# Patient Record
Sex: FEMALE | Race: WHITE | NOT HISPANIC OR LATINO | URBAN - METROPOLITAN AREA
[De-identification: names, ages, dates, MRNs, and addresses within clinical notes are randomized per-mention and may not be internally consistent; named-entity substitution may affect disease eponyms.]

---

## 2021-01-08 ENCOUNTER — INPATIENT (INPATIENT)
Facility: HOSPITAL | Age: 86
LOS: 6 days | Discharge: HOSPICE | End: 2021-01-15
Attending: INTERNAL MEDICINE | Admitting: INTERNAL MEDICINE
Payer: MEDICARE

## 2021-01-08 VITALS
OXYGEN SATURATION: 95 % | DIASTOLIC BLOOD PRESSURE: 76 MMHG | TEMPERATURE: 97 F | SYSTOLIC BLOOD PRESSURE: 100 MMHG | HEART RATE: 104 BPM | RESPIRATION RATE: 16 BRPM | HEIGHT: 58 IN | WEIGHT: 89.95 LBS

## 2021-01-08 LAB
ALBUMIN SERPL ELPH-MCNC: 2.7 G/DL — LOW (ref 3.3–5)
ALP SERPL-CCNC: 97 U/L — SIGNIFICANT CHANGE UP (ref 40–120)
ALT FLD-CCNC: 15 U/L — SIGNIFICANT CHANGE UP (ref 12–78)
ANION GAP SERPL CALC-SCNC: 4 MMOL/L — LOW (ref 5–17)
APPEARANCE UR: ABNORMAL
APTT BLD: 23.6 SEC — LOW (ref 27.5–35.5)
AST SERPL-CCNC: 16 U/L — SIGNIFICANT CHANGE UP (ref 15–37)
BACTERIA # UR AUTO: ABNORMAL
BILIRUB SERPL-MCNC: 0.4 MG/DL — SIGNIFICANT CHANGE UP (ref 0.2–1.2)
BILIRUB UR-MCNC: NEGATIVE — SIGNIFICANT CHANGE UP
BLD GP AB SCN SERPL QL: SIGNIFICANT CHANGE UP
BUN SERPL-MCNC: 65 MG/DL — HIGH (ref 7–23)
CALCIUM SERPL-MCNC: 8.1 MG/DL — LOW (ref 8.5–10.1)
CHLORIDE SERPL-SCNC: 129 MMOL/L — HIGH (ref 96–108)
CO2 SERPL-SCNC: 24 MMOL/L — SIGNIFICANT CHANGE UP (ref 22–31)
COLOR SPEC: YELLOW — SIGNIFICANT CHANGE UP
CREAT SERPL-MCNC: 1.92 MG/DL — HIGH (ref 0.5–1.3)
DIFF PNL FLD: NEGATIVE — SIGNIFICANT CHANGE UP
EPI CELLS # UR: SIGNIFICANT CHANGE UP
GLUCOSE SERPL-MCNC: 126 MG/DL — HIGH (ref 70–99)
GLUCOSE UR QL: NEGATIVE MG/DL — SIGNIFICANT CHANGE UP
HCT VFR BLD CALC: 20 % — CRITICAL LOW (ref 34.5–45)
HGB BLD-MCNC: 4.5 G/DL — CRITICAL LOW (ref 11.5–15.5)
HYALINE CASTS # UR AUTO: ABNORMAL /LPF
INR BLD: 1.3 RATIO — HIGH (ref 0.88–1.16)
KETONES UR-MCNC: NEGATIVE — SIGNIFICANT CHANGE UP
LEUKOCYTE ESTERASE UR-ACNC: NEGATIVE — SIGNIFICANT CHANGE UP
MCHC RBC-ENTMCNC: 15.5 PG — LOW (ref 27–34)
MCHC RBC-ENTMCNC: 22.5 GM/DL — LOW (ref 32–36)
MCV RBC AUTO: 69 FL — LOW (ref 80–100)
NITRITE UR-MCNC: NEGATIVE — SIGNIFICANT CHANGE UP
NRBC # BLD: 0 /100 WBCS — SIGNIFICANT CHANGE UP (ref 0–0)
PH UR: 5 — SIGNIFICANT CHANGE UP (ref 5–8)
PLATELET # BLD AUTO: 152 K/UL — SIGNIFICANT CHANGE UP (ref 150–400)
POTASSIUM SERPL-MCNC: 3.9 MMOL/L — SIGNIFICANT CHANGE UP (ref 3.5–5.3)
POTASSIUM SERPL-SCNC: 3.9 MMOL/L — SIGNIFICANT CHANGE UP (ref 3.5–5.3)
PROT SERPL-MCNC: 7.2 GM/DL — SIGNIFICANT CHANGE UP (ref 6–8.3)
PROT UR-MCNC: 15 MG/DL
PROTHROM AB SERPL-ACNC: 14.9 SEC — HIGH (ref 10.6–13.6)
RBC # BLD: 2.9 M/UL — LOW (ref 3.8–5.2)
RBC # FLD: 24.2 % — HIGH (ref 10.3–14.5)
SODIUM SERPL-SCNC: 157 MMOL/L — HIGH (ref 135–145)
SP GR SPEC: 1.01 — SIGNIFICANT CHANGE UP (ref 1.01–1.02)
UROBILINOGEN FLD QL: NEGATIVE MG/DL — SIGNIFICANT CHANGE UP
WBC # BLD: 12.37 K/UL — HIGH (ref 3.8–10.5)
WBC # FLD AUTO: 12.37 K/UL — HIGH (ref 3.8–10.5)
WBC UR QL: SIGNIFICANT CHANGE UP

## 2021-01-08 PROCEDURE — 70486 CT MAXILLOFACIAL W/O DYE: CPT | Mod: 26,MA

## 2021-01-08 PROCEDURE — 70450 CT HEAD/BRAIN W/O DYE: CPT | Mod: 26,MA

## 2021-01-08 PROCEDURE — 93010 ELECTROCARDIOGRAM REPORT: CPT

## 2021-01-08 PROCEDURE — 99285 EMERGENCY DEPT VISIT HI MDM: CPT

## 2021-01-08 PROCEDURE — 99223 1ST HOSP IP/OBS HIGH 75: CPT | Mod: AI

## 2021-01-08 RX ORDER — SODIUM CHLORIDE 9 MG/ML
1000 INJECTION INTRAMUSCULAR; INTRAVENOUS; SUBCUTANEOUS ONCE
Refills: 0 | Status: COMPLETED | OUTPATIENT
Start: 2021-01-08 | End: 2021-01-08

## 2021-01-08 RX ORDER — PANTOPRAZOLE SODIUM 20 MG/1
40 TABLET, DELAYED RELEASE ORAL DAILY
Refills: 0 | Status: DISCONTINUED | OUTPATIENT
Start: 2021-01-08 | End: 2021-01-09

## 2021-01-08 RX ORDER — HEPARIN SODIUM 5000 [USP'U]/ML
5000 INJECTION INTRAVENOUS; SUBCUTANEOUS EVERY 12 HOURS
Refills: 0 | Status: DISCONTINUED | OUTPATIENT
Start: 2021-01-08 | End: 2021-01-09

## 2021-01-08 RX ADMIN — SODIUM CHLORIDE 1000 MILLILITER(S): 9 INJECTION INTRAMUSCULAR; INTRAVENOUS; SUBCUTANEOUS at 12:34

## 2021-01-08 NOTE — H&P ADULT - HISTORY OF PRESENT ILLNESS
89 year old male admitted for facial pain. On arrival found to be anemic and have hypernatremia.   89 year old female no significant PMH admitted for facial pain. On arrival found to be anemic and have hypernatremia.  Lives at home with HHA. Spoke to nephew Ronny at [phone]. He is looking for eventual nursing home placement.  CT sinuses normal on arrival. Patient is non-verbal at baseline.   Patient is more bedbound past several weeks, no much ambulation.    89 year old female no significant PMH admitted for facial pain. On arrival found to be anemic and have hypernatremia.  Lives at home with HHA. Spoke to nephew Ronny at [phone]. He is looking for eventual nursing home placement.  CT sinuses normal on arrival. Patient is non-verbal at baseline.   Patient is more bedbound past several weeks, no much ambulation. Guiac negative in ER.

## 2021-01-08 NOTE — ED ADULT NURSE NOTE - NSFALLRSKASSESASSIST_ED_ALL_ED
Assumed care of pt  Offers no c/o while lying in bed  sats good slight  Edema noted to ankles  Watching tv    Will review    yes

## 2021-01-08 NOTE — ED ADULT TRIAGE NOTE - CHIEF COMPLAINT QUOTE
Aide said difficulty chewing with right side facial area swollen, denies trauma, not answering questions which is her baseline according to nephew as per EMS, pt fidgety on ems stretcher moved closer to desk fall risk

## 2021-01-08 NOTE — ED PROVIDER NOTE - CLINICAL SUMMARY MEDICAL DECISION MAKING FREE TEXT BOX
no ttp on facial exam. no swelling or erythema. screening labs asignificant for hypernatremia and severe anemia. rectal exam with no melena or blood. rehydration and transfusion adminsitered. patietn at baseline mental status.

## 2021-01-08 NOTE — ED ADULT NURSE NOTE - OBJECTIVE STATEMENT
received pt to bed PHall awake, alert, non verbal. as per triage note "Aide said difficulty chewing with right side facial area swollen, denies trauma," pt denies pain with palpation. awaiting evaluation by md. received pt to bed PHall awake, alert, non verbal. as per triage note "Aide said difficulty chewing with right side facial area swollen, denies trauma," pt denies pain with palpation. awaiting evaluation by md.. blanchable redness to sacrum and trochanter

## 2021-01-08 NOTE — H&P ADULT - NSHPPHYSICALEXAM_GEN_ALL_CORE
PHYSICAL EXAMINATION:  Vital Signs Last 24 Hrs  T(C): 36.3 (08 Jan 2021 11:22), Max: 36.3 (08 Jan 2021 11:22)  T(F): 97.4 (08 Jan 2021 11:22), Max: 97.4 (08 Jan 2021 11:22)  HR: 104 (08 Jan 2021 11:22) (104 - 104)  BP: 100/76 (08 Jan 2021 11:22) (100/76 - 100/76)  BP(mean): --  RR: 16 (08 Jan 2021 11:22) (16 - 16)  SpO2: 95% (08 Jan 2021 11:22) (95% - 95%)  CAPILLARY BLOOD GLUCOSE          GENERAL: NAD, well-groomed, well-developed  HEAD:  atraumatic, normocephalic  EYES: sclera anicteric  ENMT: mucous membranes moist  NECK: supple, No JVD  CHEST/LUNG: clear to auscultation bilaterally; no rales, rhonchi, or wheezing b/l  HEART: normal S1, S2  ABDOMEN: BS+, soft, ND, NT   EXTREMITIES:  pulses palpable; no clubbing, cyanosis, or edema b/l LEs  NEURO: awake, alert, interactive; moves all extremities  SKIN: no rashes or lesions PHYSICAL EXAMINATION:  Vital Signs Last 24 Hrs  T(C): 36.3 (08 Jan 2021 11:22), Max: 36.3 (08 Jan 2021 11:22)  T(F): 97.4 (08 Jan 2021 11:22), Max: 97.4 (08 Jan 2021 11:22)  HR: 104 (08 Jan 2021 11:22) (104 - 104)  BP: 100/76 (08 Jan 2021 11:22) (100/76 - 100/76)  BP(mean): --  RR: 16 (08 Jan 2021 11:22) (16 - 16)  SpO2: 95% (08 Jan 2021 11:22) (95% - 95%)  CAPILLARY BLOOD GLUCOSE          GENERAL: NAD, well-groomed, well-developed, seen in ER, non-verbal at baseline,  HEAD:  atraumatic, normocephalic  EYES: sclera anicteric  ENMT: mucous membranes moist  NECK: supple, No JVD  CHEST/LUNG: clear to auscultation bilaterally; no rales, rhonchi, or wheezing b/l  HEART: normal S1, S2  ABDOMEN: BS+, soft, ND, NT   EXTREMITIES:  pulses palpable; no clubbing, cyanosis, or edema b/l LEs  NEURO: awake, alert, interactive; moves all extremities  SKIN: no rashes or lesions

## 2021-01-08 NOTE — H&P ADULT - ASSESSMENT
89 year old male admitted for facial pain. On arrival found to be anemic and have hypernatremia.  89 year old female no significant PMH admitted for facial pain. On arrival found to be anemic and have hypernatremia.  Lives at home with HHA. Spoke to nephmiguel Jefferson at [phone]. He is looking for eventual nursing home placement.  CT sinuses normal on arrival. Patient is non-verbal at baseline.       GI: Guiac negative in ER. HGB low at 4.5. Start IV Protonix. Two units of PRBC given, follow CBC in AM. Likely anemia of chronic disease  as guiac negtive. Family does not want GI eval.     Renal: LINN from dehydration and hypernatremia. IVF D5W, follow SMA-7 in AM.      Nephew will bring in home med list in AM.   Spoke to olivia Jefferson at [phone]. Did not have med list with him.

## 2021-01-08 NOTE — H&P ADULT - NSHPLABSRESULTS_GEN_ALL_CORE
LABS:                        4.5    12.37 )-----------( 152      ( 2021 12:52 )             20.0     01-08    157<H>  |  129<H>  |  65<H>  ----------------------------<  126<H>  3.9   |  24  |  1.92<H>    Ca    8.1<L>      2021 12:52    TPro  7.2  /  Alb  2.7<L>  /  TBili  0.4  /  DBili  x   /  AST  16  /  ALT  15  /  AlkPhos  97  01-08    PT/INR - ( 2021 12:52 )   PT: 14.9 sec;   INR: 1.30 ratio         PTT - ( 2021 12:52 )  PTT:23.6 sec  Urinalysis Basic - ( 2021 12:55 )    Color: Yellow / Appearance: Slightly Turbid / S.015 / pH: x  Gluc: x / Ketone: Negative  / Bili: Negative / Urobili: Negative mg/dL   Blood: x / Protein: 15 mg/dL / Nitrite: Negative   Leuk Esterase: Negative / RBC: x / WBC 0-2   Sq Epi: x / Non Sq Epi: Few / Bacteria: Moderate          RADIOLOGY & ADDITIONAL TESTS:

## 2021-01-08 NOTE — ED PROVIDER NOTE - OBJECTIVE STATEMENT
89F presents with decreased PO intake for 1 week. family assumes it is due to facial pain. no recent trauma. no fevers and no recnt illness.

## 2021-01-09 LAB
ANION GAP SERPL CALC-SCNC: 4 MMOL/L — LOW (ref 5–17)
ANION GAP SERPL CALC-SCNC: 5 MMOL/L — SIGNIFICANT CHANGE UP (ref 5–17)
ANION GAP SERPL CALC-SCNC: 7 MMOL/L — SIGNIFICANT CHANGE UP (ref 5–17)
BUN SERPL-MCNC: 40 MG/DL — HIGH (ref 7–23)
BUN SERPL-MCNC: 41 MG/DL — HIGH (ref 7–23)
BUN SERPL-MCNC: 51 MG/DL — HIGH (ref 7–23)
CALCIUM SERPL-MCNC: 7.5 MG/DL — LOW (ref 8.5–10.1)
CALCIUM SERPL-MCNC: 7.7 MG/DL — LOW (ref 8.5–10.1)
CALCIUM SERPL-MCNC: 7.7 MG/DL — LOW (ref 8.5–10.1)
CHLORIDE SERPL-SCNC: 136 MMOL/L — HIGH (ref 96–108)
CHLORIDE SERPL-SCNC: 136 MMOL/L — HIGH (ref 96–108)
CHLORIDE SERPL-SCNC: 141 MMOL/L — HIGH (ref 96–108)
CO2 SERPL-SCNC: 21 MMOL/L — LOW (ref 22–31)
CO2 SERPL-SCNC: 22 MMOL/L — SIGNIFICANT CHANGE UP (ref 22–31)
CO2 SERPL-SCNC: 23 MMOL/L — SIGNIFICANT CHANGE UP (ref 22–31)
CREAT SERPL-MCNC: 1.19 MG/DL — SIGNIFICANT CHANGE UP (ref 0.5–1.3)
CREAT SERPL-MCNC: 1.19 MG/DL — SIGNIFICANT CHANGE UP (ref 0.5–1.3)
CREAT SERPL-MCNC: 1.32 MG/DL — HIGH (ref 0.5–1.3)
GLUCOSE SERPL-MCNC: 104 MG/DL — HIGH (ref 70–99)
GLUCOSE SERPL-MCNC: 106 MG/DL — HIGH (ref 70–99)
GLUCOSE SERPL-MCNC: 110 MG/DL — HIGH (ref 70–99)
HCT VFR BLD CALC: 30.8 % — LOW (ref 34.5–45)
HGB BLD-MCNC: 9 G/DL — LOW (ref 11.5–15.5)
MCHC RBC-ENTMCNC: 21.7 PG — LOW (ref 27–34)
MCHC RBC-ENTMCNC: 29.2 GM/DL — LOW (ref 32–36)
MCV RBC AUTO: 74.4 FL — LOW (ref 80–100)
NRBC # BLD: 0 /100 WBCS — SIGNIFICANT CHANGE UP (ref 0–0)
PLATELET # BLD AUTO: 125 K/UL — LOW (ref 150–400)
POTASSIUM SERPL-MCNC: 3.1 MMOL/L — LOW (ref 3.5–5.3)
POTASSIUM SERPL-MCNC: 3.4 MMOL/L — LOW (ref 3.5–5.3)
POTASSIUM SERPL-MCNC: 3.6 MMOL/L — SIGNIFICANT CHANGE UP (ref 3.5–5.3)
POTASSIUM SERPL-SCNC: 3.1 MMOL/L — LOW (ref 3.5–5.3)
POTASSIUM SERPL-SCNC: 3.4 MMOL/L — LOW (ref 3.5–5.3)
POTASSIUM SERPL-SCNC: 3.6 MMOL/L — SIGNIFICANT CHANGE UP (ref 3.5–5.3)
RBC # BLD: 4.14 M/UL — SIGNIFICANT CHANGE UP (ref 3.8–5.2)
RBC # FLD: 22.5 % — HIGH (ref 10.3–14.5)
SODIUM SERPL-SCNC: 163 MMOL/L — CRITICAL HIGH (ref 135–145)
SODIUM SERPL-SCNC: 165 MMOL/L — CRITICAL HIGH (ref 135–145)
SODIUM SERPL-SCNC: 167 MMOL/L — CRITICAL HIGH (ref 135–145)
WBC # BLD: 10.78 K/UL — HIGH (ref 3.8–10.5)
WBC # FLD AUTO: 10.78 K/UL — HIGH (ref 3.8–10.5)

## 2021-01-09 PROCEDURE — 99233 SBSQ HOSP IP/OBS HIGH 50: CPT

## 2021-01-09 PROCEDURE — 99497 ADVNCD CARE PLAN 30 MIN: CPT

## 2021-01-09 RX ORDER — FERROUS SULFATE 325(65) MG
300 TABLET ORAL DAILY
Refills: 0 | Status: DISCONTINUED | OUTPATIENT
Start: 2021-01-09 | End: 2021-01-14

## 2021-01-09 RX ORDER — SODIUM CHLORIDE 9 MG/ML
1000 INJECTION, SOLUTION INTRAVENOUS
Refills: 0 | Status: DISCONTINUED | OUTPATIENT
Start: 2021-01-09 | End: 2021-01-09

## 2021-01-09 RX ORDER — FLUCONAZOLE 150 MG/1
100 TABLET ORAL ONCE
Refills: 0 | Status: COMPLETED | OUTPATIENT
Start: 2021-01-09 | End: 2021-01-09

## 2021-01-09 RX ORDER — POTASSIUM CHLORIDE 20 MEQ
40 PACKET (EA) ORAL EVERY 4 HOURS
Refills: 0 | Status: DISCONTINUED | OUTPATIENT
Start: 2021-01-09 | End: 2021-01-09

## 2021-01-09 RX ORDER — SODIUM CHLORIDE 9 MG/ML
1000 INJECTION, SOLUTION INTRAVENOUS
Refills: 0 | Status: DISCONTINUED | OUTPATIENT
Start: 2021-01-09 | End: 2021-01-11

## 2021-01-09 RX ORDER — INFLUENZA VIRUS VACCINE 15; 15; 15; 15 UG/.5ML; UG/.5ML; UG/.5ML; UG/.5ML
0.5 SUSPENSION INTRAMUSCULAR ONCE
Refills: 0 | Status: DISCONTINUED | OUTPATIENT
Start: 2021-01-09 | End: 2021-01-15

## 2021-01-09 RX ORDER — PANTOPRAZOLE SODIUM 20 MG/1
40 TABLET, DELAYED RELEASE ORAL DAILY
Refills: 0 | Status: DISCONTINUED | OUTPATIENT
Start: 2021-01-09 | End: 2021-01-15

## 2021-01-09 RX ORDER — POTASSIUM CHLORIDE 20 MEQ
40 PACKET (EA) ORAL EVERY 4 HOURS
Refills: 0 | Status: COMPLETED | OUTPATIENT
Start: 2021-01-09 | End: 2021-01-10

## 2021-01-09 RX ORDER — FLUCONAZOLE 150 MG/1
TABLET ORAL
Refills: 0 | Status: DISCONTINUED | OUTPATIENT
Start: 2021-01-09 | End: 2021-01-10

## 2021-01-09 RX ORDER — FLUCONAZOLE 150 MG/1
100 TABLET ORAL EVERY 24 HOURS
Refills: 0 | Status: DISCONTINUED | OUTPATIENT
Start: 2021-01-10 | End: 2021-01-10

## 2021-01-09 RX ADMIN — SODIUM CHLORIDE 75 MILLILITER(S): 9 INJECTION, SOLUTION INTRAVENOUS at 08:49

## 2021-01-09 RX ADMIN — SODIUM CHLORIDE 500 MILLILITER(S): 9 INJECTION, SOLUTION INTRAVENOUS at 20:30

## 2021-01-09 RX ADMIN — PANTOPRAZOLE SODIUM 40 MILLIGRAM(S): 20 TABLET, DELAYED RELEASE ORAL at 11:51

## 2021-01-09 RX ADMIN — FLUCONAZOLE 50 MILLIGRAM(S): 150 TABLET ORAL at 18:27

## 2021-01-09 NOTE — PROGRESS NOTE ADULT - SUBJECTIVE AND OBJECTIVE BOX
CHIEF COMPLAINT: Follow up of dehydration and facial pain  no fever  no sob reported  + malodorous discharge from mouth  no active gross bleeding       PHYSICAL EXAM:    GENERAL: elderly and non verbal  HEENT. malodorous discharge from mouth. dried blood.   CHEST/LUNG: Decreased air entry bibasally, no wheezing, no crackles   HEART: Regular rate and rhythm; No murmurs, rubs  ABDOMEN: Soft, Nontender, Nondistended; Bowel sounds present  EXTREMITIES:  no cyanosis or edema legs.   NERVOUS SYSTEM:  limited but grossly non focal.   Psychiatry: Alert and non verbal      OBJECTIVE DATA:   Vital Signs Last 24 Hrs  T(C): 36.4 (2021 11:05), Max: 37.5 (2021 20:01)  T(F): 97.6 (2021 11:05), Max: 99.5 (2021 20:01)  HR: 83 (2021 11:51) (77 - 109)  BP: 110/56 (2021 11:51) (96/37 - 110/59)  BP(mean): 76 (2021 03:26) (76 - 76)  RR: 15 (2021 11:51) (15 - 18)  SpO2: 94% (2021 11:51) (94% - 98%)           Daily     Daily   LABS:                        9.0    10.78 )-----------( 125      ( 2021 07:22 )             30.8             -09    165<HH>  |  136<H>  |  51<H>  ----------------------------<  106<H>  3.6   |  22  |  1.32<H>    Ca    7.5<L>      2021 07:22    TPro  7.2  /  Alb  2.7<L>  /  TBili  0.4  /  DBili  x   /  AST  16  /  ALT  15  /  AlkPhos  97  -08              PT/INR - ( 2021 12:52 )   PT: 14.9 sec;   INR: 1.30 ratio         PTT - ( 2021 12:52 )  PTT:23.6 sec  Urinalysis Basic - ( 2021 12:55 )    Color: Yellow / Appearance: Slightly Turbid / S.015 / pH: x  Gluc: x / Ketone: Negative  / Bili: Negative / Urobili: Negative mg/dL   Blood: x / Protein: 15 mg/dL / Nitrite: Negative   Leuk Esterase: Negative / RBC: x / WBC 0-2   Sq Epi: x / Non Sq Epi: Few / Bacteria: Moderate         Interval Radiology studies: reviewed by me    < from: CT Head No Cont (21 @ 15:56) >  IMPRESSION:  Right parietal mass most characteristic of a meningioma without acute findings.    < end of copied text >      MEDICATIONS  (STANDING):  dextrose 5%. 1000 milliLiter(s) (125 mL/Hr) IV Continuous <Continuous>  ferrous    sulfate Liquid 300 milliGRAM(s) Oral daily  fluconAZOLE IVPB      influenza   Vaccine 0.5 milliLiter(s) IntraMuscular once  pantoprazole  Injectable 40 milliGRAM(s) IV Push daily

## 2021-01-09 NOTE — PROGRESS NOTE ADULT - ASSESSMENT
Severe anemia superimposed on likely anemia of chronic disease and microcytic anemia. no active gross bleeding. FOBT negative. s/p blood transfusion. Trend H and H. check iron profile. start feosol.   ARF. unknown baseline creatinine. cont IVF> follow I and Os. follow BMP now and in am.   oral candidiasis and malodorous discharge mouth in setting of dehydration. cont IVF. Give iv diflucan. oral hygiene  Facial pain. parietal mass on CT head is likely incidental finding. Per Nephew Veda (POA)>> no aggressive intervention.   Hypovolemic hypernatremia. start dextrose IVF and free water. follow BMP now and in am.     DVT ppx: SCDs   DNR/DNI

## 2021-01-09 NOTE — ED ADULT NURSE REASSESSMENT NOTE - NS ED NURSE REASSESS COMMENT FT1
No acute transfusion reaction noted post PRBCS, v/s stable, no acute distress noted, will continue to monitor.

## 2021-01-09 NOTE — PROVIDER CONTACT NOTE (CRITICAL VALUE NOTIFICATION) - ACTION/TREATMENT ORDERED:
none at this time, patient just completed 1/2 NS bolus
d5 increased to 150mL/hr, Calderon, bolus.
ORDERS TO FOLLOW

## 2021-01-10 LAB
ANION GAP SERPL CALC-SCNC: 3 MMOL/L — LOW (ref 5–17)
ANION GAP SERPL CALC-SCNC: 5 MMOL/L — SIGNIFICANT CHANGE UP (ref 5–17)
BUN SERPL-MCNC: 31 MG/DL — HIGH (ref 7–23)
BUN SERPL-MCNC: 33 MG/DL — HIGH (ref 7–23)
CALCIUM SERPL-MCNC: 7.3 MG/DL — LOW (ref 8.5–10.1)
CALCIUM SERPL-MCNC: 7.3 MG/DL — LOW (ref 8.5–10.1)
CHLORIDE SERPL-SCNC: 132 MMOL/L — HIGH (ref 96–108)
CHLORIDE SERPL-SCNC: 133 MMOL/L — HIGH (ref 96–108)
CO2 SERPL-SCNC: 17 MMOL/L — LOW (ref 22–31)
CO2 SERPL-SCNC: 20 MMOL/L — LOW (ref 22–31)
CREAT SERPL-MCNC: 1.05 MG/DL — SIGNIFICANT CHANGE UP (ref 0.5–1.3)
CREAT SERPL-MCNC: 1.07 MG/DL — SIGNIFICANT CHANGE UP (ref 0.5–1.3)
FOLATE SERPL-MCNC: 8.1 NG/ML — SIGNIFICANT CHANGE UP
GLUCOSE SERPL-MCNC: 147 MG/DL — HIGH (ref 70–99)
GLUCOSE SERPL-MCNC: 148 MG/DL — HIGH (ref 70–99)
HCT VFR BLD CALC: 28.3 % — LOW (ref 34.5–45)
HGB BLD-MCNC: 8.1 G/DL — LOW (ref 11.5–15.5)
IRON SATN MFR SERPL: 13 % — LOW (ref 14–50)
IRON SATN MFR SERPL: 28 UG/DL — LOW (ref 30–160)
MCHC RBC-ENTMCNC: 21.3 PG — LOW (ref 27–34)
MCHC RBC-ENTMCNC: 28.6 GM/DL — LOW (ref 32–36)
MCV RBC AUTO: 74.5 FL — LOW (ref 80–100)
NRBC # BLD: 1 /100 WBCS — HIGH (ref 0–0)
OB PNL STL: NEGATIVE — SIGNIFICANT CHANGE UP
PLATELET # BLD AUTO: 94 K/UL — LOW (ref 150–400)
POTASSIUM SERPL-MCNC: 4.1 MMOL/L — SIGNIFICANT CHANGE UP (ref 3.5–5.3)
POTASSIUM SERPL-MCNC: 4.3 MMOL/L — SIGNIFICANT CHANGE UP (ref 3.5–5.3)
POTASSIUM SERPL-SCNC: 4.1 MMOL/L — SIGNIFICANT CHANGE UP (ref 3.5–5.3)
POTASSIUM SERPL-SCNC: 4.3 MMOL/L — SIGNIFICANT CHANGE UP (ref 3.5–5.3)
RAPID RVP RESULT: SIGNIFICANT CHANGE UP
RBC # BLD: 3.8 M/UL — SIGNIFICANT CHANGE UP (ref 3.8–5.2)
RBC # BLD: 3.8 M/UL — SIGNIFICANT CHANGE UP (ref 3.8–5.2)
RBC # FLD: 22.6 % — HIGH (ref 10.3–14.5)
RETICS #: 22.7 K/UL — LOW (ref 25–125)
RETICS/RBC NFR: 0.6 % — SIGNIFICANT CHANGE UP (ref 0.5–2.5)
SARS-COV-2 RNA SPEC QL NAA+PROBE: SIGNIFICANT CHANGE UP
SODIUM SERPL-SCNC: 154 MMOL/L — HIGH (ref 135–145)
SODIUM SERPL-SCNC: 156 MMOL/L — HIGH (ref 135–145)
TIBC SERPL-MCNC: 223 UG/DL — SIGNIFICANT CHANGE UP (ref 220–430)
UIBC SERPL-MCNC: 194 UG/DL — SIGNIFICANT CHANGE UP (ref 110–370)
VIT B12 SERPL-MCNC: 582 PG/ML — SIGNIFICANT CHANGE UP (ref 232–1245)
WBC # BLD: 11.03 K/UL — HIGH (ref 3.8–10.5)
WBC # FLD AUTO: 11.03 K/UL — HIGH (ref 3.8–10.5)

## 2021-01-10 PROCEDURE — 99497 ADVNCD CARE PLAN 30 MIN: CPT

## 2021-01-10 PROCEDURE — 99233 SBSQ HOSP IP/OBS HIGH 50: CPT

## 2021-01-10 RX ORDER — FLUCONAZOLE 150 MG/1
100 TABLET ORAL DAILY
Refills: 0 | Status: COMPLETED | OUTPATIENT
Start: 2021-01-10 | End: 2021-01-13

## 2021-01-10 RX ORDER — FLUCONAZOLE 150 MG/1
100 TABLET ORAL DAILY
Refills: 0 | Status: DISCONTINUED | OUTPATIENT
Start: 2021-01-10 | End: 2021-01-10

## 2021-01-10 RX ADMIN — SODIUM CHLORIDE 150 MILLILITER(S): 9 INJECTION, SOLUTION INTRAVENOUS at 03:31

## 2021-01-10 RX ADMIN — Medication 40 MILLIEQUIVALENT(S): at 02:28

## 2021-01-10 RX ADMIN — PANTOPRAZOLE SODIUM 40 MILLIGRAM(S): 20 TABLET, DELAYED RELEASE ORAL at 12:57

## 2021-01-10 RX ADMIN — SODIUM CHLORIDE 150 MILLILITER(S): 9 INJECTION, SOLUTION INTRAVENOUS at 12:57

## 2021-01-10 RX ADMIN — Medication 300 MILLIGRAM(S): at 12:57

## 2021-01-10 RX ADMIN — Medication 40 MILLIEQUIVALENT(S): at 00:13

## 2021-01-10 RX ADMIN — FLUCONAZOLE 100 MILLIGRAM(S): 150 TABLET ORAL at 16:46

## 2021-01-10 NOTE — PROGRESS NOTE ADULT - ASSESSMENT
Severe anemia superimposed on likely anemia of chronic disease and microcytic anemia. no active gross bleeding. FOBT negative. s/p blood transfusion. Trend H and H. Iron deficiency. cont feosol.   ARF. improved. likely prerenal with dehydration. s/p lemon. follow strict I and Os.   oral candidiasis and malodorous discharge mouth in setting of dehydration. cont IVF. Give oral instead of iv diflucan x 3 days. oral hygiene  Facial pain. parietal mass on CT head is likely incidental finding. Per Nephew Veda (POA)>> no aggressive intervention.   Hypovolemic hypernatremia. improving slowly but still elevated BUN/Cr ratio. cont dextrose IVF. follow BMP in am.     DVT ppx: SCDs   DNR/DNI  remove lemon in am and start voiding trial if hypernatremia improves and good urine output.   goals of care. discussed with Veda again today>>> ok with placement, hospice/palliative care. consulted Dr Day. LEAH dimas.

## 2021-01-10 NOTE — PROGRESS NOTE ADULT - CONVERSATION/DISCUSSION
Diagnosis/Prognosis/Treatment Options/Palliative Care Referral
Diagnosis/Prognosis/Hospice Referral/Palliative Care Referral

## 2021-01-10 NOTE — PROGRESS NOTE ADULT - CONVERSATION DETAILS
discussed about patient's condition.   discussed about code status and also about palliative care referral.   Al will email POA and home meds list to me  agreed with DNR and DNI   agreed with Palliative care consult.   will do MOLST form tomorrow.
MOLST completed today.   no rehospitalization.   palliative care consult.

## 2021-01-10 NOTE — PROGRESS NOTE ADULT - SUBJECTIVE AND OBJECTIVE BOX
CHIEF COMPLAINT: still dry mouth.   little urine output from lemon  tolerating very little orally  no fever no vomiting reported  no active gross bleeding reported.       PHYSICAL EXAM:    GENERAL: elderly and non verbal  HEENT. malodorous discharge from mouth better.   CHEST/LUNG: Decreased air entry bibasally, no wheezing, no crackles   HEART: Regular rate and rhythm; No murmurs, rubs  ABDOMEN: Soft, Nontender, Nondistended; Bowel sounds present  EXTREMITIES:  no cyanosis or edema legs.   NERVOUS SYSTEM:  limited but grossly non focal.   Psychiatry: Alert and non verbal      OBJECTIVE DATA:     Vital Signs Last 24 Hrs  T(C): 37.2 (10 Julian 2021 11:54), Max: 37.2 (10 Julian 2021 11:54)  T(F): 99 (10 Julian 2021 11:54), Max: 99 (10 Julian 2021 11:54)  HR: 83 (10 Julian 2021 11:54) (73 - 102)  BP: 107/56 (10 Julian 2021 11:54) (100/54 - 116/63)  BP(mean): --  RR: 18 (10 Julian 2021 11:54) (16 - 18)  SpO2: 96% (10 Julian 2021 11:54) (93% - 98%)           Daily     Daily   LABS:                        8.1    11.03 )-----------( 94       ( 10 Julian 2021 07:18 )             28.3             01-10    154<H>  |  132<H>  |  31<H>  ----------------------------<  147<H>  4.1   |  17<L>  |  1.07    Ca    7.3<L>      10 Julian 2021 10:59      MEDICATIONS  (STANDING):  dextrose 5%. 1000 milliLiter(s) (150 mL/Hr) IV Continuous <Continuous>  ferrous    sulfate Liquid 300 milliGRAM(s) Oral daily  fluconAZOLE   40 mG/mL Suspension 100 milliGRAM(s) Oral daily  influenza   Vaccine 0.5 milliLiter(s) IntraMuscular once  pantoprazole   Suspension 40 milliGRAM(s) Oral daily  sodium chloride 0.45%. 1000 milliLiter(s) (500 mL/Hr) IV Continuous <Continuous>

## 2021-01-11 DIAGNOSIS — Z51.5 ENCOUNTER FOR PALLIATIVE CARE: ICD-10-CM

## 2021-01-11 DIAGNOSIS — E87.0 HYPEROSMOLALITY AND HYPERNATREMIA: ICD-10-CM

## 2021-01-11 DIAGNOSIS — R53.2 FUNCTIONAL QUADRIPLEGIA: ICD-10-CM

## 2021-01-11 DIAGNOSIS — D64.9 ANEMIA, UNSPECIFIED: ICD-10-CM

## 2021-01-11 DIAGNOSIS — R62.7 ADULT FAILURE TO THRIVE: ICD-10-CM

## 2021-01-11 DIAGNOSIS — E43 UNSPECIFIED SEVERE PROTEIN-CALORIE MALNUTRITION: ICD-10-CM

## 2021-01-11 LAB
ANION GAP SERPL CALC-SCNC: 5 MMOL/L — SIGNIFICANT CHANGE UP (ref 5–17)
BUN SERPL-MCNC: 16 MG/DL — SIGNIFICANT CHANGE UP (ref 7–23)
CALCIUM SERPL-MCNC: 7.3 MG/DL — LOW (ref 8.5–10.1)
CHLORIDE SERPL-SCNC: 121 MMOL/L — HIGH (ref 96–108)
CO2 SERPL-SCNC: 20 MMOL/L — LOW (ref 22–31)
CREAT SERPL-MCNC: 0.86 MG/DL — SIGNIFICANT CHANGE UP (ref 0.5–1.3)
GLUCOSE SERPL-MCNC: 120 MG/DL — HIGH (ref 70–99)
HCT VFR BLD CALC: 27.9 % — LOW (ref 34.5–45)
HGB BLD-MCNC: 8.1 G/DL — LOW (ref 11.5–15.5)
MCHC RBC-ENTMCNC: 21.6 PG — LOW (ref 27–34)
MCHC RBC-ENTMCNC: 29 GM/DL — LOW (ref 32–36)
MCV RBC AUTO: 74.4 FL — LOW (ref 80–100)
NRBC # BLD: 1 /100 WBCS — HIGH (ref 0–0)
PLATELET # BLD AUTO: 82 K/UL — LOW (ref 150–400)
POTASSIUM SERPL-MCNC: 3.4 MMOL/L — LOW (ref 3.5–5.3)
POTASSIUM SERPL-SCNC: 3.4 MMOL/L — LOW (ref 3.5–5.3)
RBC # BLD: 3.75 M/UL — LOW (ref 3.8–5.2)
RBC # FLD: 24.4 % — HIGH (ref 10.3–14.5)
SARS-COV-2 IGG SERPL QL IA: NEGATIVE — SIGNIFICANT CHANGE UP
SARS-COV-2 IGM SERPL IA-ACNC: 0.23 RATIO — SIGNIFICANT CHANGE UP
SODIUM SERPL-SCNC: 146 MMOL/L — HIGH (ref 135–145)
WBC # BLD: 11.31 K/UL — HIGH (ref 3.8–10.5)
WBC # FLD AUTO: 11.31 K/UL — HIGH (ref 3.8–10.5)

## 2021-01-11 PROCEDURE — 99232 SBSQ HOSP IP/OBS MODERATE 35: CPT

## 2021-01-11 PROCEDURE — 99223 1ST HOSP IP/OBS HIGH 75: CPT

## 2021-01-11 RX ORDER — POTASSIUM CHLORIDE 20 MEQ
20 PACKET (EA) ORAL ONCE
Refills: 0 | Status: DISCONTINUED | OUTPATIENT
Start: 2021-01-11 | End: 2021-01-11

## 2021-01-11 RX ORDER — POTASSIUM CHLORIDE 20 MEQ
20 PACKET (EA) ORAL ONCE
Refills: 0 | Status: COMPLETED | OUTPATIENT
Start: 2021-01-11 | End: 2021-01-11

## 2021-01-11 RX ORDER — SODIUM CHLORIDE 9 MG/ML
1000 INJECTION, SOLUTION INTRAVENOUS
Refills: 0 | Status: DISCONTINUED | OUTPATIENT
Start: 2021-01-11 | End: 2021-01-15

## 2021-01-11 RX ORDER — MORPHINE SULFATE 50 MG/1
5 CAPSULE, EXTENDED RELEASE ORAL EVERY 4 HOURS
Refills: 0 | Status: DISCONTINUED | OUTPATIENT
Start: 2021-01-11 | End: 2021-01-15

## 2021-01-11 RX ADMIN — Medication 20 MILLIEQUIVALENT(S): at 12:01

## 2021-01-11 RX ADMIN — Medication 300 MILLIGRAM(S): at 12:01

## 2021-01-11 RX ADMIN — SODIUM CHLORIDE 150 MILLILITER(S): 9 INJECTION, SOLUTION INTRAVENOUS at 10:48

## 2021-01-11 RX ADMIN — SODIUM CHLORIDE 150 MILLILITER(S): 9 INJECTION, SOLUTION INTRAVENOUS at 02:14

## 2021-01-11 RX ADMIN — SODIUM CHLORIDE 70 MILLILITER(S): 9 INJECTION, SOLUTION INTRAVENOUS at 21:56

## 2021-01-11 RX ADMIN — PANTOPRAZOLE SODIUM 40 MILLIGRAM(S): 20 TABLET, DELAYED RELEASE ORAL at 12:02

## 2021-01-11 RX ADMIN — SODIUM CHLORIDE 70 MILLILITER(S): 9 INJECTION, SOLUTION INTRAVENOUS at 11:03

## 2021-01-11 RX ADMIN — FLUCONAZOLE 100 MILLIGRAM(S): 150 TABLET ORAL at 12:02

## 2021-01-11 NOTE — PROGRESS NOTE ADULT - SUBJECTIVE AND OBJECTIVE BOX
afebrile    REVIEW OF SYSTEMS:  GEN: no fever,    no chills  RESP: no SOB,   no cough  CVS: no chest pain,   no palpitations  GI: no abdominal pain,   no nausea,   no vomiting,   no constipation,   no diarrhea  : no dysuria,   no frequency  NEURO: no headache,   no dizziness  PSYCH: no depression,   not anxious  Derm : no rash    MEDICATIONS  (STANDING):  dextrose 5%. 1000 milliLiter(s) (70 mL/Hr) IV Continuous <Continuous>  ferrous    sulfate Liquid 300 milliGRAM(s) Oral daily  fluconAZOLE   Tablet 100 milliGRAM(s) Oral daily  influenza   Vaccine 0.5 milliLiter(s) IntraMuscular once  pantoprazole   Suspension 40 milliGRAM(s) Oral daily    MEDICATIONS  (PRN):      Vital Signs Last 24 Hrs  T(C): 36.4 (11 Jan 2021 05:21), Max: 37.2 (10 Julian 2021 11:54)  T(F): 97.5 (11 Jan 2021 05:21), Max: 99 (10 Julian 2021 11:54)  HR: 67 (11 Jan 2021 05:21) (67 - 83)  BP: 98/47 (11 Jan 2021 05:21) (98/47 - 112/65)  BP(mean): --  RR: 16 (11 Jan 2021 05:21) (16 - 18)  SpO2: 95% (11 Jan 2021 05:21) (94% - 97%)  CAPILLARY BLOOD GLUCOSE        I&O's Summary    10 Julian 2021 07:01  -  11 Jan 2021 07:00  --------------------------------------------------------  IN: 3450 mL / OUT: 2050 mL / NET: 1400 mL        PHYSICAL EXAM:  HEAD:  Atraumatic, Normocephalic  NECK: Supple, No   JVD  CHEST/LUNG:   no     rales,     no,    rhonchi  HEART: Regular rate and rhythm;         murmur  ABDOMEN: Soft, Nontender, ;   EXTREMITIES:    no    edema  NEUROLOGY:  alert    LABS:                        8.1    11.31 )-----------( 82       ( 11 Jan 2021 08:59 )             27.9     01-11    146<H>  |  121<H>  |  16  ----------------------------<  120<H>  3.4<L>   |  20<L>  |  0.86    Ca    7.3<L>      11 Jan 2021 08:59                              Consultant(s) Notes Reviewed:      Care Discussed with Consultants/Other Providers:     afebrile/  dementia    REVIEW OF SYSTEMS:  GEN: no fever,    no chills  RESP: no SOB,   no cough  CVS: no chest pain,   no palpitations  GI: no abdominal pain,   no nausea,   no vomiting,   no constipation,   no diarrhea  : no dysuria,   no frequency  NEURO: no headache,   no dizziness  PSYCH: no depression,   not anxious  Derm : no rash    MEDICATIONS  (STANDING):  dextrose 5%. 1000 milliLiter(s) (70 mL/Hr) IV Continuous <Continuous>  ferrous    sulfate Liquid 300 milliGRAM(s) Oral daily  fluconAZOLE   Tablet 100 milliGRAM(s) Oral daily  influenza   Vaccine 0.5 milliLiter(s) IntraMuscular once  pantoprazole   Suspension 40 milliGRAM(s) Oral daily    MEDICATIONS  (PRN):      Vital Signs Last 24 Hrs  T(C): 36.4 (11 Jan 2021 05:21), Max: 37.2 (10 Julian 2021 11:54)  T(F): 97.5 (11 Jan 2021 05:21), Max: 99 (10 Julian 2021 11:54)  HR: 67 (11 Jan 2021 05:21) (67 - 83)  BP: 98/47 (11 Jan 2021 05:21) (98/47 - 112/65)  BP(mean): --  RR: 16 (11 Jan 2021 05:21) (16 - 18)  SpO2: 95% (11 Jan 2021 05:21) (94% - 97%)  CAPILLARY BLOOD GLUCOSE        I&O's Summary    10 Julian 2021 07:01  -  11 Jan 2021 07:00  --------------------------------------------------------  IN: 3450 mL / OUT: 2050 mL / NET: 1400 mL        PHYSICAL EXAM:  HEAD:  Atraumatic, Normocephalic  NECK: Supple, No   JVD  CHEST/LUNG:   no     rales,     no,    rhonchi  HEART: Regular rate and rhythm;         murmur  ABDOMEN: Soft, Nontender, ;   EXTREMITIES:    no    edema  NEUROLOGY:  alert    LABS:                        8.1    11.31 )-----------( 82       ( 11 Jan 2021 08:59 )             27.9     01-11    146<H>  |  121<H>  |  16  ----------------------------<  120<H>  3.4<L>   |  20<L>  |  0.86    Ca    7.3<L>      11 Jan 2021 08:59                              Consultant(s) Notes Reviewed:      Care Discussed with Consultants/Other Providers:

## 2021-01-11 NOTE — CONSULT NOTE ADULT - PROBLEM SELECTOR RECOMMENDATION 6
Spoke at length with nephew Al who is sole family contact for patient. He alerted me to his concerns of the care pt has at home not being adequate or skilled enough to properly care for her. He expressed concern that her aides were "new" as of the start of the month and perhaps also new to the profession and did not feel they were doing a good enough job at caring for his aunt. He is strongly considering placement in a facility that she can have more skilled care and one that would hopefully have some kind of  palliative or hospice care in place so that his aunt may stay there as her condition declines and she nears end of life. Will share these concerns with care coordination team.

## 2021-01-11 NOTE — CONSULT NOTE ADULT - SUBJECTIVE AND OBJECTIVE BOX
HPI:  89 year old female no significant PMH admitted for facial pain. On arrival found to be anemic and have hypernatremia.  Lives at home with HHA. Spoke to nephew Ronny at [phone]. He is looking for eventual nursing home placement.  CT sinuses normal on arrival. Patient is non-verbal at baseline.   Patient is more bedbound past several weeks, no much ambulation. Guiac negative in ER.     (08 Jan 2021 17:17)    PERTINENT PM/SXH:       FAMILY HISTORY:      SOCIAL HISTORY:   Significant other/partner: Yes [ ]  No [ ] Children:  Yes [ ]  No [ ] Church/Spirituality:  Substance hx: Yes[ ]  No [ ]   Tobacco hx:  Yes [ ] No [ ]   Alcohol hx: Yes [ ] No [ ]   Home Opioid hx:  Yes [ ] No [ ]  [ ] I-Stop Reference No:  Living Situation: [ ]Home  [ ]Long term care  [ ]Rehab [ ]Other    ADVANCE DIRECTIVES:    DNR  Yes    MOLST  Yes [ ] No [ ]  Living Will  Yes [ ]  No [ ]     [ ] Health Care Proxy(s)  [ ] Surrogate(s)  [ ] Guardian           Name(s): Phone Number(s):    BASELINE (I)ADL(s) (prior to admission):  [place]: [ ]Total  [ ] Moderate [ ]Dependent    Allergies    No Known Allergies    Intolerances    MEDICATIONS  (STANDING):  dextrose 5%. 1000 milliLiter(s) (70 mL/Hr) IV Continuous <Continuous>  ferrous    sulfate Liquid 300 milliGRAM(s) Oral daily  fluconAZOLE   Tablet 100 milliGRAM(s) Oral daily  influenza   Vaccine 0.5 milliLiter(s) IntraMuscular once  pantoprazole   Suspension 40 milliGRAM(s) Oral daily  potassium chloride   Powder 20 milliEquivalent(s) Oral once    MEDICATIONS  (PRN):    PRESENT SYMPTOMS: [ ]Unable to obtain due to poor mentation   Source if other than patient:  [ ]Family   [ ]Team     Pain (Impact on QOL):    Location -   Severity -        Minimal acceptable level (0-10 scale):  Quality:   Onset:   Duration:                 Aggravating factors -  Relieving factors -  Radiation -    PAIN AD Score:     [email or web address].[email or web address]/cog/painad.pdf (press ctrl +  left click to view)    Dyspnea:  Yes [ ] No [ ] - [ ]Mild [ ]Moderate [ ]Severe  Anxiety:    Yes [ ] No [ ] - [ ]Mild [ ]Moderate [ ]Severe  Fatigue:    Yes [ ] No [ ] - [ ]Mild [ ]Moderate [ ]Severe  Nausea:    Yes [ ] No [ ] - [ ]Mild [ ]Moderate [ ]Severe                         Loss of appetite: Yes [ ] No [ ] - [ ]Mild [ ]Moderate [ ]Severe             Constipation:  Yes [ ] No [ ] - [ ]Mild [ ]Moderate [ ]Severe  Grief: Yes [ ] No [ ]     Other Symptoms:  [ ]All other review of systems negative     Karnofsky Performance Score/Palliative Performance Status Version 2:         %    http://palliative.info/resource_material/[email or web address].pdf    PHYSICAL EXAM:  Vital Signs Last 24 Hrs  T(C): 36.4 (11 Jan 2021 05:21), Max: 37.2 (10 Julian 2021 11:54)  T(F): 97.5 (11 Jan 2021 05:21), Max: 99 (10 Julian 2021 11:54)  HR: 67 (11 Jan 2021 05:21) (67 - 83)  BP: 98/47 (11 Jan 2021 05:21) (98/47 - 112/65)  BP(mean): --  RR: 16 (11 Jan 2021 05:21) (16 - 18)  SpO2: 95% (11 Jan 2021 05:21) (94% - 97%) I&O's Summary    10 Julian 2021 07:01  -  11 Jan 2021 07:00  --------------------------------------------------------  IN: 3450 mL / OUT: 2050 mL / NET: 1400 mL        GENERAL:  [ ]Alert  [ ]Oriented x   [ ]Lethargic  [ ]Cachexia  [ ]Unarousable  [ ]Verbal  [ ]Non-Verbal  Behavioral:   [ ] Anxiety  [ ] Delirium [ ] Agitation [ ] Other  HEENT:  [ ]Normal   [ ]Dry mouth   [ ]ET Tube/Trach  [ ]Oral lesions  PULMONARY:   [ ]Clear  [ ]Tachypnea  [ ]Audible excessive secretions   [ ]Rhonchi        [ ]Right [ ]Left [ ]Bilateral  [ ]Crackles        [ ]Right [ ]Left [ ]Bilateral  [ ]Wheezing     [ ]Right [ ]Left [ ]Bilateral  CARDIOVASCULAR:    [ ]Regular [ ]Irregular [ ]Tachy  [ ]Sheldon [ ]Murmur [ ]Other  GASTROINTESTINAL:  [ ]Soft  [ ]Distended   [ ]+BS  [ ]Non tender [ ]Tender  [ ]PEG [ ]OGT/ NGT  Last BM:     GENITOURINARY:  [ ]Normal [ ] Incontinent   [ ]Oliguria/Anuria   [ ]Calderon  MUSCULOSKELETAL:   [ ]Normal   [ ]Weakness  [ ]Bed/Wheelchair bound [ ]Edema  NEUROLOGIC:   [ ]No focal deficits  [ ] Cognitive impairment  [ ] Dysphagia [ ]Dysarthria [ ] Paresis [ ]Other   SKIN:   [ ]Normal   [ ]Pressure ulcer(s)  [ ]Rash    LABS:                        8.1    11.31 )-----------( 82       ( 11 Jan 2021 08:59 )             27.9   01-11    146<H>  |  121<H>  |  16  ----------------------------<  120<H>  3.4<L>   |  20<L>  |  0.86    Ca    7.3<L>      11 Jan 2021 08:59          RADIOLOGY & ADDITIONAL STUDIES:    PROTEIN CALORIE MALNUTRITION PRESENT: [ ] Yes [ ] No  [ ] PPSV2 < or = to 30% [ ] significant weight loss  [ ] poor nutritional intake [ ] catabolic state [ ] anasarca     Albumin, Serum: 2.7 g/dL (01-08-21 @ 12:52)      REFERRALS:   [ ]Chaplaincy  [ ] Hospice  [ ]Child Life  [ ]Social Work  [ ]Case management [ ]Holistic Therapy   Goals of Care Discussion Document:  HPI:  89 year old female no significant PMH admitted for facial pain. On arrival found to be anemic and have hypernatremia.  Lives at home with HHA. Spoke to nephew Al at [phone]. He is looking for eventual nursing home placement.  CT sinuses normal on arrival. Patient is non-verbal at baseline.   Patient is more bedbound past several weeks, no much ambulation. Guiac negative in ER.     (08 Jan 2021 17:17)    PERTINENT PM/SXH:       FAMILY HISTORY:      SOCIAL HISTORY:   Significant other/partner: Yes [ ]  No [x ] Children:  Yes [ ]  No [ x] Moravian/Spirituality: Scientologist   Substance hx: Yes[ ]  No [x ]   Tobacco hx:  Yes [ ] No [x ]   Alcohol hx: Yes [ ] No [x ]   Home Opioid hx:  Yes [ ] No [ ]  [ ] I-Stop Reference No:[id number]  Living Situation: [ x]Home  [ ]Long term care  [ ]Rehab [ ]Other    ADVANCE DIRECTIVES:    DNR  Yes    MOLST  Yes [ x] No [ ]  Living Will  Yes [ ]  No [x ]     [x ] Health Care Proxy(s)  [ ] Surrogate(s)  [ ] Guardian           Name(s): Phone Number(s): Al Chandra [phone]    BASELINE (I)ADL(s) (prior to admission):  24 hour HHA in place   [place]: [ ]Total  [ ] Moderate [x ]Dependent    Allergies    No Known Allergies    Intolerances    MEDICATIONS  (STANDING):  dextrose 5%. 1000 milliLiter(s) (70 mL/Hr) IV Continuous <Continuous>  ferrous    sulfate Liquid 300 milliGRAM(s) Oral daily  fluconAZOLE   Tablet 100 milliGRAM(s) Oral daily  influenza   Vaccine 0.5 milliLiter(s) IntraMuscular once  pantoprazole   Suspension 40 milliGRAM(s) Oral daily  potassium chloride   Powder 20 milliEquivalent(s) Oral once    MEDICATIONS  (PRN):    PRESENT SYMPTOMS: [ x]Unable to obtain due to poor mentation   Source if other than patient:  [ ]Family   [ ]Team     Pain (Impact on QOL):    Location -   Severity -        Minimal acceptable level (0-10 scale):  Quality:   Onset:   Duration:                 Aggravating factors -  Relieving factors -  Radiation -    PAIN AD Score: 3    [email or web address]/painad.pdf (press ctrl +  left click to view)    Dyspnea:  Yes [ ] No [ ] - [ ]Mild [ ]Moderate [ ]Severe  Anxiety:    Yes [ ] No [ ] - [ ]Mild [ ]Moderate [ ]Severe  Fatigue:    Yes [ ] No [ ] - [ ]Mild [ ]Moderate [ ]Severe  Nausea:    Yes [ ] No [ ] - [ ]Mild [ ]Moderate [ ]Severe                         Loss of appetite: Yes [ ] No [ ] - [ ]Mild [ ]Moderate [ ]Severe             Constipation:  Yes [ ] No [ ] - [ ]Mild [ ]Moderate [ ]Severe  Grief: Yes [ ] No [ ]     Other Symptoms:  [x ]All other review of systems negative     Karnofsky Performance Score/Palliative Performance Status Version 2:      20-30  %    http://palliative.info/resource_material/[email or web address].pdf    PHYSICAL EXAM:  Vital Signs Last 24 Hrs  T(C): 36.4 (11 Jan 2021 05:21), Max: 37.2 (10 Julian 2021 11:54)  T(F): 97.5 (11 Jan 2021 05:21), Max: 99 (10 Julian 2021 11:54)  HR: 67 (11 Jan 2021 05:21) (67 - 83)  BP: 98/47 (11 Jan 2021 05:21) (98/47 - 112/65)  BP(mean): --  RR: 16 (11 Jan 2021 05:21) (16 - 18)  SpO2: 95% (11 Jan 2021 05:21) (94% - 97%) I&O's Summary    10 Julian 2021 07:01  -  11 Jan 2021 07:00  --------------------------------------------------------  IN: 3450 mL / OUT: 2050 mL / NET: 1400 mL        GENERAL:  [ ]Alert  [ ]Oriented x   [ x]Lethargic  [ ]Cachexia  [ ]Unarousable  [ ]Verbal  [x ]Non-Verbal- attempting to communicate but cannot understand   Behavioral:   [ ] Anxiety  [ ] Delirium [ ] Agitation [ ] Other  HEENT:  [ ]Normal   [ x]Dry mouth   [ ]ET Tube/Trach  [x ]Oral lesions- dried blood in mouth   PULMONARY:   [ ]Clear  [ ]Tachypnea  [ ]Audible excessive secretions   [x ]Rhonchi        [ ]Right [ ]Left [x ]Bilateral  [ ]Crackles        [ ]Right [ ]Left [ ]Bilateral  [ ]Wheezing     [ ]Right [ ]Left [ ]Bilateral  CARDIOVASCULAR:    [x ]Regular [ ]Irregular [ ]Tachy  [ ]Sheldon [ ]Murmur [ ]Other  GASTROINTESTINAL:  [x ]Soft  [ ]Distended   [x ]+BS  [x ]Non tender [ ]Tender  [ ]PEG [ ]OGT/ NGT  Last BM: 1/10    GENITOURINARY:  [ ]Normal [ ] Incontinent   [ ]Oliguria/Anuria   [x ]Calderon  MUSCULOSKELETAL:   [ ]Normal   [ x]Weakness  [ x]Bed/Wheelchair bound [ ]Edema  NEUROLOGIC:   [ ]No focal deficits  [x ] Cognitive impairment  [ x] Dysphagia [ x]Dysarthria [ ] Paresis [ ]Other   SKIN:   [ ]Normal   [ ]Pressure ulcer(s)  [ ]Rash    LABS:                        8.1    11.31 )-----------( 82       ( 11 Jan 2021 08:59 )             27.9   01-11    146<H>  |  121<H>  |  16  ----------------------------<  120<H>  3.4<L>   |  20<L>  |  0.86    Ca    7.3<L>      11 Jan 2021 08:59          RADIOLOGY & ADDITIONAL STUDIES: < from: CT Head No Cont (01.08.21 @ 15:56) >    EXAM:  CT BRAIN                            PROCEDURE DATE:  01/08/2021          INTERPRETATION:  CT brain without contrast    History weakness    There are no relevant prior studies for comparison. There is a moderately large partially calcified extradural mass in the right parietal region along the falx to the right. It measures roughly 4 cm in long axis dimension exerting moderate mass effect on the adjacent cortex. There is underlying central atrophy and chronic microvascular ischemic change. There is no acute hemorrhage or cortical edema or midline shift.    IMPRESSION:  Right parietal mass most characteristic of a meningioma without acute findings.            GABBI SHIELDS MD; Attending Radiologist  This document has been electronically signed. Jan 8 2021  4:08PM    < end of copied text >    < from: CT Maxillofacial No Cont (01.08.21 @ 15:56) >    EXAM:  CT MAXILLOFACIAL                            PROCEDURE DATE:  01/08/2021          INTERPRETATION:  Maxillofacial CT without contrast    History facial swelling    There is no fracture or air-fluid level. The deep orbital contents are within normal limits.    IMPRESSION:  No acute traumatic findings            GABBI SHIELDS MD; Attending Radiologist  This document has been electronically signed. Jan 8 2021  4:19PM    < end of copied text >    PROTEIN CALORIE MALNUTRITION PRESENT: [x ] Yes [ ] No  [x ] PPSV2 < or = to 30% [x ] significant weight loss  [ x] poor nutritional intake [ ] catabolic state [ ] anasarca     Albumin, Serum: 2.7 g/dL (01-08-21 @ 12:52)      REFERRALS:   [ ]Chaplaincy  [x ] Hospice  [ ]Child Life  [xSocial Work  [x ]Case management [ ]Holistic Therapy   Goals of Care Discussion Document:

## 2021-01-11 NOTE — PROGRESS NOTE ADULT - ASSESSMENT
89 year old female    no significant PMH . liveS  at  home  with her aide      On arrival found to be   1.  Anemia, hb was  4, on arruval,  with  guaiac negative  stool , s/p prbc   and, family does ot want  any w/p   2.   Hypernatremia/  dehydration    on iv fluids  bmp  in am   3. hypokalemia    4, dementia, non verbal at baseline    on pleasure feeds     nephew Ronyn at [phone]., wants ,  eventual nursing home placement    pt is DNR/DNI/ has Molst form   awaiting hospice/ palliative care    89 year old female    no significant PMH . lives  at  home  with her aide      On arrival found to be   1.  Anemia, hb was  4, on arruval,  with  guaiac negative  stool , s/p prbc   and, family does ot want  any w/p   2.   Hypernatremia/  dehydration    on iv fluids  bmp  in am   3. hypokalemia    4, dementia, non verbal at baseline    on pleasure feeds     nephew Ronny at [phone]., wants ,  eventual nursing home placement    pt is DNR/DNI/ has Molst form   awaiting hospice/ palliative care    89 year old female    no significant PMH . lives  at  home  with her aide      On arrival found to be   1.  Anemia, hb was  4, on arruval,  with  guaiac negative  stool , s/p prbc   and, family does ot want  any w/p   2.   Hypernatremia/  dehydration    on iv fluids  bmp  in am   3. hypokalemia    4, dementia, non verbal at baseline    on pleasure feeds     nephew Ronny at [phone]., wanted  nursing home placement    pt is DNR/DNI/ has Molst form   awaiting hospice/ palliative care    89 year old female    no significant PMH . lives  at  home  with her aide      On arrival found to be   1.  Anemia, hb was  4, on arruval,  with  guaiac negative  stool , s/p prbc   and, family does ot want  any w/p   2.   Hypernatremia/  dehydration    on iv fluids  bmp  in am   3. hypokalemia    4, dementia, non verbal at baseline    on pleasure feeds/ TOV in am     nephew Ronny at [phone]., wanted  nursing home placement    pt is DNR/DNI/ has Molst form   awaiting hospice/ palliative care

## 2021-01-11 NOTE — CONSULT NOTE ADULT - PROBLEM SELECTOR RECOMMENDATION 3
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

## 2021-01-11 NOTE — CONSULT NOTE ADULT - ASSESSMENT
89 year old female PMH HTN, dementia, osteopenia, FTT admitted for facial pain found to be severely anemic and hypernatremic. Palliative Care consulted for GOC, symptom management.

## 2021-01-12 LAB
ANION GAP SERPL CALC-SCNC: 5 MMOL/L — SIGNIFICANT CHANGE UP (ref 5–17)
BUN SERPL-MCNC: 11 MG/DL — SIGNIFICANT CHANGE UP (ref 7–23)
CALCIUM SERPL-MCNC: 7.3 MG/DL — LOW (ref 8.5–10.1)
CHLORIDE SERPL-SCNC: 118 MMOL/L — HIGH (ref 96–108)
CO2 SERPL-SCNC: 19 MMOL/L — LOW (ref 22–31)
CREAT SERPL-MCNC: 0.73 MG/DL — SIGNIFICANT CHANGE UP (ref 0.5–1.3)
GLUCOSE SERPL-MCNC: 96 MG/DL — SIGNIFICANT CHANGE UP (ref 70–99)
POTASSIUM SERPL-MCNC: 3.3 MMOL/L — LOW (ref 3.5–5.3)
POTASSIUM SERPL-SCNC: 3.3 MMOL/L — LOW (ref 3.5–5.3)
SODIUM SERPL-SCNC: 142 MMOL/L — SIGNIFICANT CHANGE UP (ref 135–145)

## 2021-01-12 PROCEDURE — 99233 SBSQ HOSP IP/OBS HIGH 50: CPT

## 2021-01-12 PROCEDURE — 99232 SBSQ HOSP IP/OBS MODERATE 35: CPT

## 2021-01-12 RX ADMIN — FLUCONAZOLE 100 MILLIGRAM(S): 150 TABLET ORAL at 12:27

## 2021-01-12 RX ADMIN — PANTOPRAZOLE SODIUM 40 MILLIGRAM(S): 20 TABLET, DELAYED RELEASE ORAL at 12:28

## 2021-01-12 RX ADMIN — Medication 300 MILLIGRAM(S): at 12:28

## 2021-01-12 RX ADMIN — SODIUM CHLORIDE 70 MILLILITER(S): 9 INJECTION, SOLUTION INTRAVENOUS at 13:02

## 2021-01-12 NOTE — DIETITIAN INITIAL EVALUATION ADULT. - OTHER INFO
Unable to interview pt due to cognitive impairment. Per medical record pt lives c 24/7 aides to assist; has supportive nephew involved in her care. Pt DNR/I; bedbound c advancing dementia (non-verbal); on pleasure feeds. Nephew requests long-term care/hospice placement; no re-hospitalization or tube feeding per palliative care note. No reports of any N/V/C/D.

## 2021-01-12 NOTE — PROGRESS NOTE ADULT - SUBJECTIVE AND OBJECTIVE BOX
I am inheriting this patient on today 1/12/2021      MEDICATIONS  (STANDING):  dextrose 5%. 1000 milliLiter(s) (70 mL/Hr) IV Continuous <Continuous>  ferrous    sulfate Liquid 300 milliGRAM(s) Oral daily  fluconAZOLE   Tablet 100 milliGRAM(s) Oral daily  influenza   Vaccine 0.5 milliLiter(s) IntraMuscular once  pantoprazole   Suspension 40 milliGRAM(s) Oral daily    MEDICATIONS  (PRN):  morphine Concentrate 5 milliGRAM(s) Oral every 4 hours PRN dyspnea/pain        Vital Signs Last 24 Hrs  T(C): 36.7 (12 Jan 2021 04:57), Max: 36.8 (11 Jan 2021 17:49)  T(F): 98.1 (12 Jan 2021 04:57), Max: 98.2 (11 Jan 2021 17:49)  HR: 75 (12 Jan 2021 04:57) (68 - 88)  BP: 118/61 (12 Jan 2021 04:57) (105/52 - 131/60)  BP(mean): --  RR: 17 (12 Jan 2021 04:57) (16 - 17)  SpO2: 96% (12 Jan 2021 04:57) (96% - 97%)    PHYSICAL EXAM:  HEAD:  Atraumatic, Normocephalic  NECK: Supple, No   JVD  CHEST/LUNG:   no     rales,     no,    rhonchi  HEART: Regular rate and rhythm;         murmur  ABDOMEN: Soft, Nontender, ;   EXTREMITIES:    no    edema  NEUROLOGY:  alert    LABS:                                          8.1    11.31 )-----------( 82       ( 11 Jan 2021 08:59 )             27.9   01-12    142  |  118<H>  |  11  ----------------------------<  96  3.3<L>   |  19<L>  |  0.73    Ca    7.3<L>      12 Jan 2021 06:12            Consultant(s) Notes Reviewed:      Care Discussed with Consultants/Other Providers:     I am inheriting this patient on today 1/12/2021      MEDICATIONS  (STANDING):  dextrose 5%. 1000 milliLiter(s) (70 mL/Hr) IV Continuous <Continuous>  ferrous    sulfate Liquid 300 milliGRAM(s) Oral daily  fluconAZOLE   Tablet 100 milliGRAM(s) Oral daily  influenza   Vaccine 0.5 milliLiter(s) IntraMuscular once  pantoprazole   Suspension 40 milliGRAM(s) Oral daily    MEDICATIONS  (PRN):  morphine Concentrate 5 milliGRAM(s) Oral every 4 hours PRN dyspnea/pain        Vital Signs Last 24 Hrs  T(C): 36.7 (12 Jan 2021 04:57), Max: 36.8 (11 Jan 2021 17:49)  T(F): 98.1 (12 Jan 2021 04:57), Max: 98.2 (11 Jan 2021 17:49)  HR: 75 (12 Jan 2021 04:57) (68 - 88)  BP: 118/61 (12 Jan 2021 04:57) (105/52 - 131/60)  BP(mean): --  RR: 17 (12 Jan 2021 04:57) (16 - 17)  SpO2: 96% (12 Jan 2021 04:57) (96% - 97%)    PHYSICAL EXAM:  HEAD:  Atraumatic, Normocephalic  NECK: Supple, No   JVD  CHEST/LUNG:   no     rales,     no,    rhonchi  HEART: Regular rate and rhythm;         murmur  ABDOMEN: Soft, Nontender, ;   EXTREMITIES:    no    edema, contracted lower extremities   NEUROLOGY:  alert    LABS:                                          8.1    11.31 )-----------( 82       ( 11 Jan 2021 08:59 )             27.9   01-12    142  |  118<H>  |  11  ----------------------------<  96  3.3<L>   |  19<L>  |  0.73    Ca    7.3<L>      12 Jan 2021 06:12            Consultant(s) Notes Reviewed:      Care Discussed with Consultants/Other Providers:

## 2021-01-12 NOTE — DIETITIAN INITIAL EVALUATION ADULT. - ORAL NUTRITION SUPPLEMENTS
Ensure Pudding x 3/day (provides 510 kcal, 12 g protein) + Vital Cuisine x 2/day (provides 1040 kcal, 44 g protein)

## 2021-01-12 NOTE — PROGRESS NOTE ADULT - SUBJECTIVE AND OBJECTIVE BOX
INTERVAL HPI/OVERNIGHT EVENTS: appears improved- more alert, responsive, interactive    Code Status: DNR/I  Allergies    No Known Allergies    Intolerances    MEDICATIONS  (STANDING):  dextrose 5%. 1000 milliLiter(s) (70 mL/Hr) IV Continuous <Continuous>  ferrous    sulfate Liquid 300 milliGRAM(s) Oral daily  fluconAZOLE   Tablet 100 milliGRAM(s) Oral daily  influenza   Vaccine 0.5 milliLiter(s) IntraMuscular once  pantoprazole   Suspension 40 milliGRAM(s) Oral daily    MEDICATIONS  (PRN):  morphine Concentrate 5 milliGRAM(s) Oral every 4 hours PRN dyspnea/pain      PRESENT SYMPTOMS: [ ]Unable to obtain due to poor mentation   Source if other than patient:  [ ]Family   [ ]Team     Pain (Impact on QOL):  denies pain (shakes head no)   Location:  Severity:  Minimal acceptable level (0-10 scale):       Quality:       Onset:  Duration:  Aggravating factors:  Relieving Factors  Radiation:    Dyspnea:  Yes [ ] No [x ] - [ ]Mild [ ]Moderate [ ]Severe  Anxiety:    Yes [ ] No [ x] - [ ]Mild [ ]Moderate [ ]Severe  Fatigue:    Yes [ ] No [ x] - [ ]Mild [ ]Moderate [ ]Severe  Nausea:    Yes [ ] No [ x] - [ ]Mild [ ]Moderate [ ]Severe                         Loss of appetite: Yes [ ] No [ x] - [ ]Mild [ ]Moderate [ ]Severe             Constipation:  Yes [ ] No [x ] - [ ]Mild [ ]Moderate [ ]Severe  Grief: Yes [ ] No [x ]     PAIN AD Score:	  [email or web address] (Ctrl + left click to view)    Other Symptoms:  [ x]All other review of systems negative     Karnofsky Performance Score/Palliative Performance Status Version 2:    20-30     %    http://palliative.info/resource_material/[email or web address].pdf    PHYSICAL EXAM:  Vital Signs Last 24 Hrs  T(C): 36.9 (12 Jan 2021 13:30), Max: 36.9 (12 Jan 2021 13:30)  T(F): 98.5 (12 Jan 2021 13:30), Max: 98.5 (12 Jan 2021 13:30)  HR: 81 (12 Jan 2021 13:30) (68 - 88)  BP: 107/15 (12 Jan 2021 13:30) (105/52 - 131/60)  BP(mean): --  RR: 17 (12 Jan 2021 13:30) (16 - 17)  SpO2: 96% (12 Jan 2021 13:30) (96% - 97%) I&O's Summary    11 Jan 2021 07:01  -  12 Jan 2021 07:00  --------------------------------------------------------  IN: 1870 mL / OUT: 1900 mL / NET: -30 mL         GENERAL:  [x ]Alert  [ x]Oriented x 1  [ ]Lethargic  [ ]Cachexia  [ ]Unarousable  [ ]Verbal  [x ]Non-Verbal- mumbling/gesturing   Behavioral:   [ ] Anxiety  [ ] Delirium [ ] Agitation [ ] Other  HEENT:  [ ]Normal   [x ]Dry mouth   [ ]ET Tube/Trach  [ ]Oral lesions- scabbed lips  PULMONARY:   [ ]Clear [ ]Tachypnea  [ ]Audible excessive secretions   [x ]Rhonchi        [ ]Right [ ]Left [ x]Bilateral  [ ]Crackles        [ ]Right [ ]Left [ ]Bilateral  [ ]Wheezing     [ ]Right [ ]Left [ ]Bilateral  CARDIOVASCULAR:    [ x]Regular [ ]Irregular [ ]Tachy  [ ]Sheldon [ ]Murmur [ ]Other  GASTROINTESTINAL:  [ x]Soft  [ ]Distended   [x ]+BS  [x ]Non tender [ ]Tender  [ ]PEG [ ]OGT/ NGT   Last BM: 1/12     GENITOURINARY:  [ ]Normal [x ] Incontinent   [ ]Oliguria/Anuria   [ ]Calderon  MUSCULOSKELETAL:   [ ]Normal   [ ]Weakness  [x ]Bed/Wheelchair bound [ ]Edema  NEUROLOGIC:   [ ]No focal deficits  [x ] Cognitive impairment  [x ] Dysphagia [x]Dysarthria [ ] Paresis [ ]Other   SKIN:   [ ]Normal   [ ]Pressure ulcer(s)  [ ]Rash- scattered ecchymosis on arms @ IV/blood draw sites     CRITICAL CARE:  [ ] Shock Present  [ ]Septic [ ]Cardiogenic [ ]Neurologic [ ]Hypovolemic  [ ]  Vasopressors [ ]  Inotropes   [ ] Respiratory failure present  [ ] Acute  [ ] Chronic [ ] Hypoxic  [ ] Hypercarbic [ ] Other  [ ] Other organ failure     LABS:                        8.1    11.31 )-----------( 82       ( 11 Jan 2021 08:59 )             27.9   01-12    142  |  118<H>  |  11  ----------------------------<  96  3.3<L>   |  19<L>  |  0.73    Ca    7.3<L>      12 Jan 2021 06:12          RADIOLOGY & ADDITIONAL STUDIES:    Protein Calorie Malnutrition Present: [ x] yes [ ] no  [ x] PPSV2 < or = 30%  [ xsignificant weight loss [x] poor nutritional intake [ ] anasarca [ ] catabolic state Albumin, Serum: 2.7 g/dL (01-08-21 @ 12:52)      REFERRALS:   [ ]Chaplaincy  [x ] Hospice  [ ]Child Life  [x ]Social Work  [x ]Case management [ ]Holistic Therapy   Goals of Care Document:

## 2021-01-12 NOTE — PROGRESS NOTE ADULT - ASSESSMENT
89 year old female    no significant PMH . lives  at  home  with her aide      On arrival found to be   1.  Anemia, hb was  4, on arrival,  with  guaiac negative  stool , s/p prbc   and, family does not want  any w/u per my colleague's  note    2.   Hypernatremia/  dehydration- resolved    3. Hypokalemia  will be replaced    4, dementia, non verbal at baseline    on pleasure feeds/ TOV in am     nephew Ronny at [phone]., wanted  nursing home placement    pt is DNR/DNI/ has Molst form      reviewed hospice/ palliative care  note

## 2021-01-13 PROCEDURE — 99231 SBSQ HOSP IP/OBS SF/LOW 25: CPT

## 2021-01-13 RX ORDER — SENNA PLUS 8.6 MG/1
2 TABLET ORAL
Qty: 60 | Refills: 0
Start: 2021-01-13 | End: 2021-02-11

## 2021-01-13 RX ORDER — FUROSEMIDE 40 MG
1 TABLET ORAL
Qty: 0 | Refills: 0 | DISCHARGE

## 2021-01-13 RX ORDER — AMLODIPINE BESYLATE 2.5 MG/1
1 TABLET ORAL
Qty: 0 | Refills: 0 | DISCHARGE

## 2021-01-13 RX ORDER — MORPHINE SULFATE 50 MG/1
0.25 CAPSULE, EXTENDED RELEASE ORAL
Qty: 45 | Refills: 0
Start: 2021-01-13 | End: 2021-02-11

## 2021-01-13 RX ORDER — POTASSIUM CHLORIDE 20 MEQ
10 PACKET (EA) ORAL
Refills: 0 | Status: COMPLETED | OUTPATIENT
Start: 2021-01-13 | End: 2021-01-13

## 2021-01-13 RX ORDER — MEGESTROL ACETATE 40 MG/ML
40 SUSPENSION ORAL
Qty: 0 | Refills: 0 | DISCHARGE

## 2021-01-13 RX ORDER — FERROUS SULFATE 325(65) MG
5 TABLET ORAL
Qty: 0 | Refills: 0 | DISCHARGE
Start: 2021-01-13

## 2021-01-13 RX ADMIN — SODIUM CHLORIDE 70 MILLILITER(S): 9 INJECTION, SOLUTION INTRAVENOUS at 05:01

## 2021-01-13 RX ADMIN — Medication 100 MILLIEQUIVALENT(S): at 14:42

## 2021-01-13 RX ADMIN — Medication 100 MILLIEQUIVALENT(S): at 09:13

## 2021-01-13 RX ADMIN — Medication 100 MILLIEQUIVALENT(S): at 10:55

## 2021-01-13 RX ADMIN — FLUCONAZOLE 100 MILLIGRAM(S): 150 TABLET ORAL at 13:09

## 2021-01-13 RX ADMIN — Medication 300 MILLIGRAM(S): at 13:09

## 2021-01-13 RX ADMIN — PANTOPRAZOLE SODIUM 40 MILLIGRAM(S): 20 TABLET, DELAYED RELEASE ORAL at 13:09

## 2021-01-13 NOTE — PROGRESS NOTE ADULT - ASSESSMENT
89 year old female    no significant PMH . lives  at  home  with her aide      On arrival found to be   1.  Anemia, hb was  4, on arrival,  with  guaiac negative  stool , s/p prbc   and, family does not want  any w/u per my colleague's  note , f/u labs in am    2.   Hypernatremia/  dehydration- resolved    3. Hypokalemia  will be replaced , f/u labs in am    4, dementia, non verbal at baseline    on pleasure feeds/ TOV in am     nephew Ronny at [phone]., wanted  nursing home placement    pt is DNR/DNI/ has Molst form      reviewed hospice/ palliative care  note  per SW await decision by nephew regarding placement

## 2021-01-13 NOTE — DISCHARGE NOTE PROVIDER - DETAILS OF MALNUTRITION DIAGNOSIS/DIAGNOSES
This patient has been assessed with a concern for Malnutrition and was treated during this hospitalization for the following Nutrition diagnosis/diagnoses:     -  01/12/2021: Severe protein-calorie malnutrition   -  01/12/2021: Underweight (BMI < 19)   This patient has been assessed with a concern for Malnutrition and was treated during this hospitalization for the following Nutrition diagnosis/diagnoses:     -  01/12/2021: Severe protein-calorie malnutrition   -  01/12/2021: Underweight (BMI < 19)    This patient has been assessed with a concern for Malnutrition and was treated during this hospitalization for the following Nutrition diagnosis/diagnoses:     -  01/12/2021: Severe protein-calorie malnutrition   -  01/12/2021: Underweight (BMI < 19)   This patient has been assessed with a concern for Malnutrition and was treated during this hospitalization for the following Nutrition diagnosis/diagnoses:     -  01/12/2021: Severe protein-calorie malnutrition   -  01/12/2021: Underweight (BMI < 19)    This patient has been assessed with a concern for Malnutrition and was treated during this hospitalization for the following Nutrition diagnosis/diagnoses:     -  01/12/2021: Severe protein-calorie malnutrition   -  01/12/2021: Underweight (BMI < 19)    This patient has been assessed with a concern for Malnutrition and was treated during this hospitalization for the following Nutrition diagnosis/diagnoses:     -  01/12/2021: Severe protein-calorie malnutrition   -  01/12/2021: Underweight (BMI < 19)   This patient has been assessed with a concern for Malnutrition and was treated during this hospitalization for the following Nutrition diagnosis/diagnoses:     -  01/12/2021: Severe protein-calorie malnutrition   -  01/12/2021: Underweight (BMI < 19)    This patient has been assessed with a concern for Malnutrition and was treated during this hospitalization for the following Nutrition diagnosis/diagnoses:     -  01/12/2021: Severe protein-calorie malnutrition   -  01/12/2021: Underweight (BMI < 19)    This patient has been assessed with a concern for Malnutrition and was treated during this hospitalization for the following Nutrition diagnosis/diagnoses:     -  01/12/2021: Severe protein-calorie malnutrition   -  01/12/2021: Underweight (BMI < 19)    This patient has been assessed with a concern for Malnutrition and was treated during this hospitalization for the following Nutrition diagnosis/diagnoses:     -  01/12/2021: Severe protein-calorie malnutrition   -  01/12/2021: Underweight (BMI < 19)

## 2021-01-13 NOTE — PROGRESS NOTE ADULT - SUBJECTIVE AND OBJECTIVE BOX
I am inheriting this patient on today 1/12/2021    MEDICATIONS  (STANDING):  dextrose 5%. 1000 milliLiter(s) (70 mL/Hr) IV Continuous <Continuous>  ferrous    sulfate Liquid 300 milliGRAM(s) Oral daily  fluconAZOLE   Tablet 100 milliGRAM(s) Oral daily  influenza   Vaccine 0.5 milliLiter(s) IntraMuscular once  pantoprazole   Suspension 40 milliGRAM(s) Oral daily  potassium chloride  10 mEq/100 mL IVPB 10 milliEquivalent(s) IV Intermittent every 1 hour    MEDICATIONS  (PRN):  morphine Concentrate 5 milliGRAM(s) Oral every 4 hours PRN dyspnea/pain    Vital Signs Last 24 Hrs  T(C): 36.9 (13 Jan 2021 05:04), Max: 37.3 (12 Jan 2021 17:04)  T(F): 98.4 (13 Jan 2021 05:04), Max: 99.2 (12 Jan 2021 17:04)  HR: 74 (13 Jan 2021 05:04) (74 - 88)  BP: 98/51 (13 Jan 2021 05:04) (98/51 - 115/54)  BP(mean): --  RR: 16 (13 Jan 2021 05:04) (16 - 17)  SpO2: 96% (13 Jan 2021 05:04) (95% - 97%)      PHYSICAL EXAM:  HEAD:  Atraumatic, Normocephalic  NECK: Supple, No   JVD  CHEST/LUNG:   no     rales,     no,    rhonchi  HEART: Regular rate and rhythm;         murmur  ABDOMEN: Soft, Nontender, ;   EXTREMITIES:    no    edema, contracted lower extremities   NEUROLOGY:  alert    LABS:                              01-12    142  |  118<H>  |  11  ----------------------------<  96  3.3<L>   |  19<L>  |  0.73    Ca    7.3<L>      12 Jan 2021 06:12                Consultant(s) Notes Reviewed:      Care Discussed with Consultants/Other Providers:

## 2021-01-13 NOTE — DISCHARGE NOTE PROVIDER - NSDCCPCAREPLAN_GEN_ALL_CORE_FT
PRINCIPAL DISCHARGE DIAGNOSIS  Diagnosis: Anemia  Assessment and Plan of Treatment: stable, continue home medications      SECONDARY DISCHARGE DIAGNOSES  Diagnosis: Severe protein-calorie malnutrition  Assessment and Plan of Treatment: stable.    Diagnosis: Anemia  Assessment and Plan of Treatment:

## 2021-01-13 NOTE — DISCHARGE NOTE PROVIDER - NSDCMRMEDTOKEN_GEN_ALL_CORE_FT
alendronate 70 mg oral tablet: 1 tab(s) orally once a week  amLODIPine 5 mg oral tablet: 1 tab(s) orally once a day  ferrous sulfate 300 mg/5 mL (60 mg/5 mL elemental iron) oral liquid: 5 milliliter(s) orally once a day  furosemide 20 mg oral tablet: 1 tab(s) orally once a day  ibuprofen 200 mg oral tablet: 200 milligram(s) orally 2 times a day  loratadine 10 mg oral tablet: 1 tab(s) orally once a day  Megace 40 mg oral tablet: 40 milligram(s) orally 2 times a day  morphine 20 mg/mL oral concentrate: 0.25 milliliter(s) orally every 4 hours, As needed, dyspnea/pain MDD:6mg  omeprazole: 20 milligram(s) orally once a day  Oyster Shell: 500 milligram(s) orally once a day  simvastatin 20 mg oral tablet: 1 tab(s) orally once a day (at bedtime)   alendronate 70 mg oral tablet: 1 tab(s) orally once a week  ferrous sulfate 300 mg/5 mL (60 mg/5 mL elemental iron) oral liquid: 5 milliliter(s) orally once a day  ibuprofen 200 mg oral tablet: 200 milligram(s) orally 2 times a day  loratadine 10 mg oral tablet: 1 tab(s) orally once a day  morphine 20 mg/mL oral concentrate: 0.25 milliliter(s) orally every 4 hours, As needed, dyspnea/pain MDD:6mg  omeprazole: 20 milligram(s) orally once a day  Oyster Shell: 500 milligram(s) orally once a day  senna oral tablet: 2 tab(s) orally once a day (at bedtime)   simvastatin 20 mg oral tablet: 1 tab(s) orally once a day (at bedtime)   alendronate 70 mg oral tablet: 1 tab(s) orally once a week  ferrous sulfate 300 mg/5 mL (60 mg/5 mL elemental iron) oral liquid: 5 milliliter(s) orally once a day  ibuprofen 200 mg oral tablet: 200 milligram(s) orally 2 times a day  loratadine 10 mg oral tablet: 1 tab(s) orally once a day  morphine 20 mg/mL oral concentrate: 0.25 milliliter(s) orally every 4 hours, As needed, dyspnea/pain MDD:6mg  Oyster Shell: 500 milligram(s) orally once a day  senna oral tablet: 2 tab(s) orally once a day (at bedtime)   simvastatin 20 mg oral tablet: 1 tab(s) orally once a day (at bedtime)

## 2021-01-13 NOTE — DISCHARGE NOTE PROVIDER - HOSPITAL COURSE
89 year old female no significant PMH admitted for facial pain. On arrival found to be anemic and have hypernatremia.  Lives at home with HHA. Spoke to nephew Ronny at [phone]. He is looking for eventual nursing home placement.  CT sinuses normal on arrival. Patient is non-verbal at baseline. Patient is more bedbound past several weeks, no much ambulation. Guaiac negative in ER.     No significant PMH . lives  at  home  with her aide   On arrival found to be   1.  Anemia, hb was  4, on arrival,  with  guaiac negative  stool , s/p PRBC   and, family does not want  any w/u per my colleague's  note , f/u labs in am    2.   Hypernatremia/  dehydration- resolved    3. Hypokalemia  will be replaced , f/u labs in am    4, dementia, non verbal at baseline   on pleasure feeds/ TOV in am  nephew Ronny at [phone]., wanted  nursing home placement    pt is DNR/DNI/ has MOLST form     Seen by Palliative care;  89 year old female PMH HTN, dementia, osteopenia, FTT admitted for facial pain found to be severely anemic and hypernatremic. Palliative Care consulted for GOC, symptom management.   Problem/Plan - 1:  Problem: Failure to thrive in adult.  Plan: overall decline as of lately per report of nephew.   Problem/Plan - 2:  Problem: Hypernatremia.  Plan: due to decreased intake, improving with IVF D5W.   Problem/Plan - 3:  Problem: Anemia.  Plan: multifactorial - likely some loss from oral bleeding, poor nutritional status, advanced age/bone marrow failure   s/p transfusion, counts holding- drop in platelets maybe due to platelet washout, med effect, dilutional from IVF.   Problem/Plan - 4:  Problem: Functional quadriplegia.  Plan: bedbound dependent for all ADLs.   Problem/Plan - 5:  Problem: Severe protein-calorie malnutrition.  Plan: low albumin state due to poor intake  pt being fed, added ensure supplements - seems to be taking more today per bedside nursing aide.   Problem/Plan - 6:  Problem: Encounter for palliative care. Plan: long discussion with nephew Al yesterday regarding pt's care needs/ he expressed concerns that even though there is 24 hour aides at home, he feels her care is inadequate and wants to pursue placement. Spoke with TREMAYNE who has since shared with Al a list of facilities available.  GOC are known. [facility] in chart.  Maryanne Day (MD).  Patient is stable for discharge Home with home care.

## 2021-01-14 LAB
ANION GAP SERPL CALC-SCNC: 7 MMOL/L — SIGNIFICANT CHANGE UP (ref 5–17)
BUN SERPL-MCNC: 11 MG/DL — SIGNIFICANT CHANGE UP (ref 7–23)
CALCIUM SERPL-MCNC: 7.9 MG/DL — LOW (ref 8.5–10.1)
CHLORIDE SERPL-SCNC: 116 MMOL/L — HIGH (ref 96–108)
CO2 SERPL-SCNC: 20 MMOL/L — LOW (ref 22–31)
CREAT SERPL-MCNC: 0.66 MG/DL — SIGNIFICANT CHANGE UP (ref 0.5–1.3)
GLUCOSE SERPL-MCNC: 98 MG/DL — SIGNIFICANT CHANGE UP (ref 70–99)
HCT VFR BLD CALC: 29.9 % — LOW (ref 34.5–45)
HGB BLD-MCNC: 8.2 G/DL — LOW (ref 11.5–15.5)
MAGNESIUM SERPL-MCNC: 2.1 MG/DL — SIGNIFICANT CHANGE UP (ref 1.6–2.6)
MCHC RBC-ENTMCNC: 21.5 PG — LOW (ref 27–34)
MCHC RBC-ENTMCNC: 27.4 GM/DL — LOW (ref 32–36)
MCV RBC AUTO: 78.5 FL — LOW (ref 80–100)
NRBC # BLD: 0 /100 WBCS — SIGNIFICANT CHANGE UP (ref 0–0)
PHOSPHATE SERPL-MCNC: 2.6 MG/DL — SIGNIFICANT CHANGE UP (ref 2.5–4.5)
PLATELET # BLD AUTO: 91 K/UL — LOW (ref 150–400)
POTASSIUM SERPL-MCNC: 3.7 MMOL/L — SIGNIFICANT CHANGE UP (ref 3.5–5.3)
POTASSIUM SERPL-SCNC: 3.7 MMOL/L — SIGNIFICANT CHANGE UP (ref 3.5–5.3)
RBC # BLD: 3.81 M/UL — SIGNIFICANT CHANGE UP (ref 3.8–5.2)
RBC # FLD: 28.2 % — HIGH (ref 10.3–14.5)
SODIUM SERPL-SCNC: 143 MMOL/L — SIGNIFICANT CHANGE UP (ref 135–145)
WBC # BLD: 11.74 K/UL — HIGH (ref 3.8–10.5)
WBC # FLD AUTO: 11.74 K/UL — HIGH (ref 3.8–10.5)

## 2021-01-14 PROCEDURE — 99232 SBSQ HOSP IP/OBS MODERATE 35: CPT

## 2021-01-14 PROCEDURE — 99233 SBSQ HOSP IP/OBS HIGH 50: CPT

## 2021-01-14 RX ADMIN — SODIUM CHLORIDE 70 MILLILITER(S): 9 INJECTION, SOLUTION INTRAVENOUS at 04:03

## 2021-01-14 RX ADMIN — PANTOPRAZOLE SODIUM 40 MILLIGRAM(S): 20 TABLET, DELAYED RELEASE ORAL at 12:17

## 2021-01-14 NOTE — PROGRESS NOTE ADULT - ASSESSMENT
89 year old female    no significant PMH . lives  at  home  with her aide      On arrival found to be   1.  Anemia, hb was  4, on arrival   with  guaiac negative  stool , s/p prbc   and, family does ot want  any w/p   2.   Hypernatremia, resolved/   dehydration    on iv fluids   3,  dementia, non verbal at baseline   4. albumin is  2.7/  c/w  severe protein  calorie malnutrition    on pleasure feeds     nephew   at [phone]., wanted  nursing home placement    pt is DNR/DNI/ has Molst form   see  by  hospice/ palliative care   spoke with nephmiguel Melendez today, aim is comfort care/  no furthe r blood  draws   await  placement,   cleared  for   d/c

## 2021-01-14 NOTE — PROGRESS NOTE ADULT - SUBJECTIVE AND OBJECTIVE BOX
afebrile/  weak/  listless    REVIEW OF SYSTEMS:  GEN: no fever,    no chills  RESP: no SOB,   no cough  CVS: no chest pain,   no palpitations  GI: no abdominal pain,   no nausea,   no vomiting,   no constipation,   no diarrhea  : no dysuria,   no frequency  NEURO: no headache,   no dizziness  PSYCH: no depression,   not anxious  Derm : no rash    MEDICATIONS  (STANDING):  dextrose 5%. 1000 milliLiter(s) (70 mL/Hr) IV Continuous <Continuous>  ferrous    sulfate Liquid 300 milliGRAM(s) Oral daily  influenza   Vaccine 0.5 milliLiter(s) IntraMuscular once  pantoprazole   Suspension 40 milliGRAM(s) Oral daily    MEDICATIONS  (PRN):  morphine Concentrate 5 milliGRAM(s) Oral every 4 hours PRN dyspnea/pain      Vital Signs Last 24 Hrs  T(C): 37.3 (14 Jan 2021 05:00), Max: 37.3 (14 Jan 2021 05:00)  T(F): 99.2 (14 Jan 2021 05:00), Max: 99.2 (14 Jan 2021 05:00)  HR: 81 (14 Jan 2021 05:00) (77 - 82)  BP: 101/52 (14 Jan 2021 05:00) (101/52 - 113/62)  BP(mean): --  RR: 17 (14 Jan 2021 05:00) (17 - 18)  SpO2: 96% (14 Jan 2021 05:00) (93% - 96%)  CAPILLARY BLOOD GLUCOSE        I&O's Summary    13 Jan 2021 07:01  -  14 Jan 2021 07:00  --------------------------------------------------------  IN: 360 mL / OUT: 0 mL / NET: 360 mL        PHYSICAL EXAM:  HEAD:  Atraumatic, Normocephalic  NECK: Supple, No   JVD  CHEST/LUNG:   no     rales,     no,    rhonchi  HEART: Regular rate and rhythm;         murmur  ABDOMEN: Soft, Nontender, ;   EXTREMITIES:    no    edema  NEUROLOGY:  alert    LABS:                        8.2    11.74 )-----------( 91       ( 14 Jan 2021 08:13 )             29.9     01-14    143  |  116<H>  |  11  ----------------------------<  98  3.7   |  20<L>  |  0.66    Ca    7.9<L>      14 Jan 2021 08:13  Phos  2.6     01-14  Mg     2.1     01-14                              Consultant(s) Notes Reviewed:      Care Discussed with Consultants/Other Providers:

## 2021-01-14 NOTE — PROGRESS NOTE ADULT - PROBLEM SELECTOR PLAN 2
due to decreased intake, normalized with IVF D5W likely can d/c fluids
due to decreased intake, improving with IVF D5W

## 2021-01-14 NOTE — PROGRESS NOTE ADULT - PROBLEM SELECTOR PLAN 3
multifactorial - likely some loss from oral bleeding, poor nutritional status, advanced age/bone marrow failure   s/p transfusion, counts holding- drop in platelets maybe due to platelet washout, med effect, dilutional from IVF.
multifactorial - likely some loss from oral bleeding, poor nutritional status, advanced age/bone marrow failure   s/p transfusion, counts holding- drop in platelets maybe due to platelet washout, med effect, dilutional from IVF.

## 2021-01-14 NOTE — PROGRESS NOTE ADULT - SUBJECTIVE AND OBJECTIVE BOX
INTERVAL HPI/OVERNIGHT EVENTS: none- pending discharge    Code Status: DNR/I  Allergies    No Known Allergies    Intolerances    MEDICATIONS  (STANDING):  dextrose 5%. 1000 milliLiter(s) (70 mL/Hr) IV Continuous <Continuous>  ferrous    sulfate Liquid 300 milliGRAM(s) Oral daily  influenza   Vaccine 0.5 milliLiter(s) IntraMuscular once  pantoprazole   Suspension 40 milliGRAM(s) Oral daily    MEDICATIONS  (PRN):  morphine Concentrate 5 milliGRAM(s) Oral every 4 hours PRN dyspnea/pain      PRESENT SYMPTOMS: [x ]Unable to obtain due to poor mentation   Source if other than patient:  [ ]Family   [ ]Team     Pain (Impact on QOL):    Location:  Severity:  Minimal acceptable level (0-10 scale):       Quality:       Onset:  Duration:  Aggravating factors:  Relieving Factors  Radiation:    Dyspnea:  Yes [ ] No [ ] - [ ]Mild [ ]Moderate [ ]Severe  Anxiety:    Yes [ ] No [ ] - [ ]Mild [ ]Moderate [ ]Severe  Fatigue:    Yes [ ] No [ ] - [ ]Mild [ ]Moderate [ ]Severe  Nausea:    Yes [ ] No [ ] - [ ]Mild [ ]Moderate [ ]Severe                         Loss of appetite: Yes [ ] No [ ] - [ ]Mild [ ]Moderate [ ]Severe             Constipation:  Yes [ ] No [ ] - [ ]Mild [ ]Moderate [ ]Severe  Grief: Yes [ ] No [ ]     PAIN AD Score:	0  [email or web address] (Ctrl + left click to view)    Other Symptoms:  [x ]All other review of systems negative     Karnofsky Performance Score/Palliative Performance Status Version 2:     20-30    %    http://palliative.info/resource_material/[email or web address].pdf    PHYSICAL EXAM:  Vital Signs Last 24 Hrs  T(C): 37.3 (14 Jan 2021 05:00), Max: 37.3 (14 Jan 2021 05:00)  T(F): 99.2 (14 Jan 2021 05:00), Max: 99.2 (14 Jan 2021 05:00)  HR: 81 (14 Jan 2021 05:00) (77 - 82)  BP: 101/52 (14 Jan 2021 05:00) (101/52 - 113/62)  BP(mean): --  RR: 17 (14 Jan 2021 05:00) (17 - 18)  SpO2: 96% (14 Jan 2021 05:00) (93% - 96%) I&O's Summary    13 Jan 2021 07:01  -  14 Jan 2021 07:00  --------------------------------------------------------  IN: 360 mL / OUT: 0 mL / NET: 360 mL         GENERAL:  [x ]Alert  [x ]Oriented x2   [ ]Lethargic  [ ]Cachexia  [ ]Unarousable  [ ]Verbal  [x ]Non-Verbal- mutters some can say hi occasionally   Behavioral:   [ ] Anxiety  [ ] Delirium [ ] Agitation [ ] Other  HEENT:  [ ]Normal   [x ]Dry mouth   [ ]ET Tube/Trach  [ ]Oral lesions  PULMONARY:   [ x]Clear [ ]Tachypnea  [ ]Audible excessive secretions   [ ]Rhonchi        [ ]Right [ ]Left [ ]Bilateral  [ ]Crackles        [ ]Right [ ]Left [ ]Bilateral  [ ]Wheezing     [ ]Right [ ]Left [ ]Bilateral  CARDIOVASCULAR:    [ xRegular [ ]Irregular [ ]Tachy  [ ]Sheldon [ ]Murmur [ ]Other  GASTROINTESTINAL:  [x ]Soft  [ ]Distended   [x ]+BS  [ x]Non tender [ ]Tender  [ ]PEG [ ]OGT/ NGT   Last BM: 1/12     GENITOURINARY:  [ ]Normal [ x] Incontinent   [ ]Oliguria/Anuria   [ ]Calderon  MUSCULOSKELETAL:   [ ]Normal   [ ]Weakness  [x ]Bed/Wheelchair bound [ ]Edema  NEUROLOGIC:   [ ]No focal deficits  [ x] Cognitive impairment  [ x] Dysphagia [ x]Dysarthria [ ] Paresis [ ]Other   SKIN:   [ ]Normal   [ ]Pressure ulcer(s)  [ ]Rash    CRITICAL CARE:  [ ] Shock Present  [ ]Septic [ ]Cardiogenic [ ]Neurologic [ ]Hypovolemic  [ ]  Vasopressors [ ]  Inotropes   [ ] Respiratory failure present  [ ] Acute  [ ] Chronic [ ] Hypoxic  [ ] Hypercarbic [ ] Other  [ ] Other organ failure     LABS:                        8.2    11.74 )-----------( 91       ( 14 Jan 2021 08:13 )             29.9   01-14    143  |  116<H>  |  11  ----------------------------<  98  3.7   |  20<L>  |  0.66    Ca    7.9<L>      14 Jan 2021 08:13  Phos  2.6     01-14  Mg     2.1     01-14          RADIOLOGY & ADDITIONAL STUDIES:     Protein Calorie Malnutrition Present: [ x] yes [ ] no  [x ] PPSV2 < or = 30%  [x ] significant weight loss [x ] poor nutritional intake [ ] anasarca [ x] catabolic state Albumin, Serum: 2.7 g/dL (01-08-21 @ 12:52)      REFERRALS:   [ ]Chaplaincy  [ ] Hospice  [ ]Child Life  [ x]Social Work  [ x]Case management [ ]Holistic Therapy   Goals of Care Document:

## 2021-01-14 NOTE — PROGRESS NOTE ADULT - NUTRITIONAL ASSESSMENT
This patient has been assessed with a concern for Malnutrition and has been determined to have a diagnosis/diagnoses of Severe protein-calorie malnutrition and Underweight (BMI < 19).    This patient is being managed with:   Diet Full Liquid-  Supplement Feeding Modality:  Oral  Health Shake Cans or Servings Per Day:  1       Frequency:  Two Times a day  Ensure Pudding Cans or Servings Per Day:  1       Frequency:  Three Times a day  Entered: Jan 12 2021 11:52AM

## 2021-01-14 NOTE — PHYSICAL THERAPY INITIAL EVALUATION ADULT - ADDITIONAL COMMENTS
Per care coordination note, Pt is dependent prior to admission. Pt lives in apartment with 24/7 aide services. Has wheelchair.

## 2021-01-14 NOTE — PROGRESS NOTE ADULT - PROBLEM SELECTOR PLAN 6
long discussion with nephmiguel Melendez yesterday regarding pt's care needs/ he expressed concerns that even though there is 24 hour aides at home, he feels her care is inadequate and wants to pursue placement. Spoke with TREMAYNE who has since shared with Al a list of facilities available.  GOC are known. [facility] in chart.
pt's nephew is back and forth about discharge plan was asking placement then wanted her to return home today he again asked care coordination for placement; he feels her care is inadequate and wants to pursue placement.  Al has a list of facilities available.  GOC are known. [facility] in chart.

## 2021-01-14 NOTE — PROGRESS NOTE ADULT - PROBLEM SELECTOR PLAN 5
low albumin state due to poor intake  pt being fed, added ensure supplements - seems to be taking more today per bedside nursing aide
low albumin state due to poor intake  pt being fed, added ensure supplements - seems to be taking more today per bedside nursing aide

## 2021-01-14 NOTE — PROGRESS NOTE ADULT - PROBLEM SELECTOR PLAN 1
overall decline as of lately per report of nephew.
overall decline as of lately per report of nephew.

## 2021-01-14 NOTE — PHYSICAL THERAPY INITIAL EVALUATION ADULT - PERTINENT HX OF CURRENT PROBLEM, REHAB EVAL
Per H&P, Pt is a 89 year old female no significant PMH admitted for facial pain. On arrival found to be anemic and have hypernatremia.

## 2021-01-15 VITALS
TEMPERATURE: 98 F | OXYGEN SATURATION: 95 % | RESPIRATION RATE: 17 BRPM | DIASTOLIC BLOOD PRESSURE: 55 MMHG | SYSTOLIC BLOOD PRESSURE: 104 MMHG | HEART RATE: 85 BPM

## 2021-01-15 LAB — SARS-COV-2 RNA SPEC QL NAA+PROBE: SIGNIFICANT CHANGE UP

## 2021-01-15 PROCEDURE — 99239 HOSP IP/OBS DSCHRG MGMT >30: CPT

## 2021-01-15 RX ORDER — IBUPROFEN 200 MG
200 TABLET ORAL
Qty: 0 | Refills: 0 | DISCHARGE

## 2021-01-15 RX ORDER — OMEPRAZOLE 10 MG/1
2 CAPSULE, DELAYED RELEASE ORAL
Qty: 60 | Refills: 0
Start: 2021-01-15 | End: 2021-02-13

## 2021-01-15 RX ORDER — LORATADINE 10 MG/1
1 TABLET ORAL
Qty: 0 | Refills: 0 | DISCHARGE

## 2021-01-15 RX ORDER — OMEPRAZOLE 10 MG/1
20 CAPSULE, DELAYED RELEASE ORAL
Qty: 0 | Refills: 0 | DISCHARGE

## 2021-01-15 RX ORDER — CALCIUM CARBONATE 500(1250)
500 TABLET ORAL
Qty: 0 | Refills: 0 | DISCHARGE

## 2021-01-15 RX ORDER — SIMVASTATIN 20 MG/1
1 TABLET, FILM COATED ORAL
Qty: 0 | Refills: 0 | DISCHARGE

## 2021-01-15 RX ORDER — ALENDRONATE SODIUM 70 MG/1
1 TABLET ORAL
Qty: 0 | Refills: 0 | DISCHARGE

## 2021-01-15 RX ADMIN — PANTOPRAZOLE SODIUM 40 MILLIGRAM(S): 20 TABLET, DELAYED RELEASE ORAL at 12:04

## 2021-01-15 NOTE — PROGRESS NOTE ADULT - ASSESSMENT
89 year old female    no significant PMH . lives  at  home  with her aide      On arrival found to be   1.  Anemia, hb was  4, on arrival   with  guaiac negative  stool , s/p prbc   and, family does ot want  any w/p   2.   Hypernatremia, resolved/   dehydration    on iv fluids   3,  dementia, non verbal at baseline   4. albumin is  2.7/  c/w  severe protein  calorie malnutrition    on pleasure feeds     nephew   at [phone]., wanted  nursing home placement    pt is DNR/DNI/ has Molst form   see  by  hospice/ palliative care   spoke with nephew Al today, aim is comfort care/  no furthe r blood  draws   await  placement,   cleared  for   d/c  FTT/  cleraed  for   d/c

## 2021-01-15 NOTE — DISCHARGE NOTE NURSING/CASE MANAGEMENT/SOCIAL WORK - PATIENT PORTAL LINK FT
You can access the FollowMyHealth Patient Portal offered by [facility] by registering at the following website: [email or web address]. By joining [facility]’s FollowMyHealth portal, you will also be able to view your health information using other applications (apps) compatible with our system.

## 2021-01-15 NOTE — PROGRESS NOTE ADULT - PROVIDER SPECIALTY LIST ADULT
Hospitalist
Internal Medicine
Hospitalist
Hospitalist
Internal Medicine
Internal Medicine
Hospitalist
Palliative Care
Palliative Care

## 2021-01-15 NOTE — PROGRESS NOTE ADULT - NUTRITIONAL ASSESSMENT
This patient has been assessed with a concern for Malnutrition and has been determined to have a diagnosis/diagnoses of Severe protein-calorie malnutrition and Underweight (BMI < 19).    This patient is being managed with:   Diet Dysphagia 1 Pureed-Honey Consistency Fluid-  Entered: Jan 14 2021 11:34AM

## 2021-01-15 NOTE — PROGRESS NOTE ADULT - REASON FOR ADMISSION
Failure to thrive.

## 2021-01-15 NOTE — PROGRESS NOTE ADULT - SUBJECTIVE AND OBJECTIVE BOX
afebrile/  weak   FTT    REVIEW OF SYSTEMS:  GEN: no fever,    no chills  RESP: no SOB,   no cough  CVS: no chest pain,   no palpitations  GI: no abdominal pain,   no nausea,   no vomiting,   no constipation,   no diarrhea  : no dysuria,   no frequency  NEURO: no headache,   no dizziness  PSYCH: no depression,   not anxious  Derm : no rash    MEDICATIONS  (STANDING):  dextrose 5%. 1000 milliLiter(s) (70 mL/Hr) IV Continuous <Continuous>  influenza   Vaccine 0.5 milliLiter(s) IntraMuscular once  pantoprazole   Suspension 40 milliGRAM(s) Oral daily    MEDICATIONS  (PRN):  morphine Concentrate 5 milliGRAM(s) Oral every 4 hours PRN dyspnea/pain      Vital Signs Last 24 Hrs  T(C): 36.9 (15 Julian 2021 04:16), Max: 36.9 (15 Julian 2021 04:16)  T(F): 98.4 (15 Julian 2021 04:16), Max: 98.4 (15 Julian 2021 04:16)  HR: 79 (15 Julian 2021 04:16) (72 - 83)  BP: 109/54 (15 Julian 2021 04:16) (101/52 - 120/52)  BP(mean): --  RR: 17 (15 Julian 2021 04:16) (17 - 18)  SpO2: 96% (15 Julian 2021 04:16) (95% - 98%)  CAPILLARY BLOOD GLUCOSE        I&O's Summary      PHYSICAL EXAM:  HEAD:  Atraumatic, Normocephalic  NECK: Supple, No   JVD  CHEST/LUNG:   no     rales,     no,    rhonchi  HEART: Regular rate and rhythm;         murmur  ABDOMEN: Soft, Nontender, ;   EXTREMITIES:    NO    edema  NEUROLOGY:  alert    LABS:                        8.2    11.74 )-----------( 91       ( 14 Jan 2021 08:13 )             29.9     01-14    143  |  116<H>  |  11  ----------------------------<  98  3.7   |  20<L>  |  0.66    Ca    7.9<L>      14 Jan 2021 08:13  Phos  2.6     01-14  Mg     2.1     01-14                              Consultant(s) Notes Reviewed:      Care Discussed with Consultants/Other Providers:

## 2021-01-15 NOTE — CHART NOTE - NSCHARTNOTEFT_GEN_A_CORE
MOLST on chart, GOC known. Per nephew's request, pt to be placed. Pt approved to go to facility pending bed availability. Pt seen and examined is at baseline, no symptoms of distress or pain. Will sign off.

## 2021-01-22 DIAGNOSIS — N17.9 ACUTE KIDNEY FAILURE, UNSPECIFIED: ICD-10-CM

## 2021-01-22 DIAGNOSIS — Z66 DO NOT RESUSCITATE: ICD-10-CM

## 2021-01-22 DIAGNOSIS — B37.0 CANDIDAL STOMATITIS: ICD-10-CM

## 2021-01-22 DIAGNOSIS — R53.2 FUNCTIONAL QUADRIPLEGIA: ICD-10-CM

## 2021-01-22 DIAGNOSIS — E87.0 HYPEROSMOLALITY AND HYPERNATREMIA: ICD-10-CM

## 2021-01-22 DIAGNOSIS — E87.6 HYPOKALEMIA: ICD-10-CM

## 2021-01-22 DIAGNOSIS — M85.80 OTHER SPECIFIED DISORDERS OF BONE DENSITY AND STRUCTURE, UNSPECIFIED SITE: ICD-10-CM

## 2021-01-22 DIAGNOSIS — D63.8 ANEMIA IN OTHER CHRONIC DISEASES CLASSIFIED ELSEWHERE: ICD-10-CM

## 2021-01-22 DIAGNOSIS — E86.0 DEHYDRATION: ICD-10-CM

## 2021-01-22 DIAGNOSIS — F03.90 UNSPECIFIED DEMENTIA WITHOUT BEHAVIORAL DISTURBANCE: ICD-10-CM

## 2021-01-22 DIAGNOSIS — Z51.5 ENCOUNTER FOR PALLIATIVE CARE: ICD-10-CM

## 2021-01-22 DIAGNOSIS — R51.9 HEADACHE, UNSPECIFIED: ICD-10-CM

## 2021-01-22 DIAGNOSIS — R62.7 ADULT FAILURE TO THRIVE: ICD-10-CM

## 2021-01-22 DIAGNOSIS — E43 UNSPECIFIED SEVERE PROTEIN-CALORIE MALNUTRITION: ICD-10-CM

## 2021-01-22 DIAGNOSIS — D50.9 IRON DEFICIENCY ANEMIA, UNSPECIFIED: ICD-10-CM

## 2022-06-22 NOTE — PHYSICAL THERAPY INITIAL EVALUATION ADULT - ADL SKILLS, REHAB EVAL
unable to perform Positioning (Leave Blank If You Do Not Want): The patient was placed in a comfortable position exposing the surgical site.

## 2022-10-15 NOTE — PATIENT PROFILE ADULT - TOBACCO USE
[facility] spoke with Marjan Moore said will fax the med list they were having problems with the fax machine earlier. Never smoker

## 2023-09-18 NOTE — DIETITIAN INITIAL EVALUATION ADULT. - PHYSICAL ASSESSMENT TEMPLES
severe Aklief Pregnancy And Lactation Text: It is unknown if this medication is safe to use during pregnancy.  It is unknown if this medication is excreted in breast milk.  Breastfeeding women should use the topical cream on the smallest area of the skin for the shortest time needed while breastfeeding.  Do not apply to nipple and areola.